# Patient Record
Sex: MALE | Race: WHITE | NOT HISPANIC OR LATINO | Employment: OTHER | URBAN - METROPOLITAN AREA
[De-identification: names, ages, dates, MRNs, and addresses within clinical notes are randomized per-mention and may not be internally consistent; named-entity substitution may affect disease eponyms.]

---

## 2017-01-12 RX ORDER — METOPROLOL TARTRATE 50 MG/1
TABLET, FILM COATED ORAL
Qty: 180 TAB | Refills: 2 | Status: SHIPPED | OUTPATIENT
Start: 2017-01-12 | End: 2017-10-11 | Stop reason: SDUPTHER

## 2017-01-12 RX ORDER — AMLODIPINE BESYLATE 10 MG/1
TABLET ORAL
Qty: 90 TAB | Refills: 2 | Status: SHIPPED | OUTPATIENT
Start: 2017-01-12 | End: 2017-10-11 | Stop reason: SDUPTHER

## 2017-02-05 ENCOUNTER — PATIENT OUTREACH (OUTPATIENT)
Dept: HEALTH INFORMATION MANAGEMENT | Facility: OTHER | Age: 72
End: 2017-02-05

## 2017-02-06 NOTE — PROGRESS NOTES
02/05/2017  Outcome:declined annual wellness visit     Attempt # 1st     Annual Wellness Visit Scheduling  Scheduling Status: Scheduled; Not Scheduled; not scheduled   If Not Scheduled, Choose Reason Why: not interested          Care Gap Scheduling (Attempt to Schedule EACH Overdue Care Gap!)  Health Maintenance Due   Topic    • Annual Wellness Visit  Declined    • IMM DTaP/Tdap/Td Vaccine (1 - Tdap) Didn't discuss    • COLONOSCOPY  Didn't discuss    • IMM ZOSTER VACCINE  Didn't discuss    • IMM PNEUMOCOCCAL 65+ (ADULT) LOW/MEDIUM RISK SERIES (1 of 2 - PCV13) Didn't discuss    • IMM INFLUENZA (1) Didn't discuss          MyChart Activation:  Already Active/Declined/Sent Activation Code  na

## 2017-02-16 RX ORDER — PENTOXIFYLLINE 400 MG/1
TABLET, EXTENDED RELEASE ORAL
Qty: 270 TAB | Refills: 0 | Status: SHIPPED | OUTPATIENT
Start: 2017-02-16 | End: 2017-05-15 | Stop reason: SDUPTHER

## 2017-02-16 RX ORDER — LISINOPRIL AND HYDROCHLOROTHIAZIDE 25; 20 MG/1; MG/1
TABLET ORAL
Qty: 90 TAB | Refills: 0 | Status: SHIPPED | OUTPATIENT
Start: 2017-02-16 | End: 2017-05-15 | Stop reason: SDUPTHER

## 2017-02-27 RX ORDER — FENOFIBRATE 160 MG/1
TABLET ORAL
Qty: 90 TAB | Refills: 3 | Status: SHIPPED | OUTPATIENT
Start: 2017-02-27 | End: 2018-03-03 | Stop reason: SDUPTHER

## 2017-03-13 RX ORDER — TRAZODONE HYDROCHLORIDE 50 MG/1
TABLET ORAL
Qty: 180 TAB | Refills: 0 | Status: SHIPPED | OUTPATIENT
Start: 2017-03-13 | End: 2017-06-09 | Stop reason: SDUPTHER

## 2017-03-14 ENCOUNTER — OFFICE VISIT (OUTPATIENT)
Dept: MEDICAL GROUP | Facility: PHYSICIAN GROUP | Age: 72
End: 2017-03-14
Payer: MEDICARE

## 2017-03-14 VITALS
SYSTOLIC BLOOD PRESSURE: 130 MMHG | TEMPERATURE: 97.8 F | OXYGEN SATURATION: 95 % | RESPIRATION RATE: 16 BRPM | HEART RATE: 55 BPM | WEIGHT: 228 LBS | HEIGHT: 69 IN | BODY MASS INDEX: 33.77 KG/M2 | DIASTOLIC BLOOD PRESSURE: 60 MMHG

## 2017-03-14 DIAGNOSIS — E78.2 MIXED HYPERLIPIDEMIA: ICD-10-CM

## 2017-03-14 DIAGNOSIS — I10 ESSENTIAL HYPERTENSION: ICD-10-CM

## 2017-03-14 DIAGNOSIS — R73.01 ELEVATED FASTING GLUCOSE: ICD-10-CM

## 2017-03-14 DIAGNOSIS — I73.9 PAD (PERIPHERAL ARTERY DISEASE) (HCC): ICD-10-CM

## 2017-03-14 PROCEDURE — G8432 DEP SCR NOT DOC, RNG: HCPCS | Performed by: FAMILY MEDICINE

## 2017-03-14 PROCEDURE — 4040F PNEUMOC VAC/ADMIN/RCVD: CPT | Mod: 8P | Performed by: FAMILY MEDICINE

## 2017-03-14 PROCEDURE — G8484 FLU IMMUNIZE NO ADMIN: HCPCS | Performed by: FAMILY MEDICINE

## 2017-03-14 PROCEDURE — 1101F PT FALLS ASSESS-DOCD LE1/YR: CPT | Performed by: FAMILY MEDICINE

## 2017-03-14 PROCEDURE — 99204 OFFICE O/P NEW MOD 45 MIN: CPT | Performed by: FAMILY MEDICINE

## 2017-03-14 PROCEDURE — 1036F TOBACCO NON-USER: CPT | Performed by: FAMILY MEDICINE

## 2017-03-14 PROCEDURE — G8419 CALC BMI OUT NRM PARAM NOF/U: HCPCS | Performed by: FAMILY MEDICINE

## 2017-03-14 PROCEDURE — 3017F COLORECTAL CA SCREEN DOC REV: CPT | Mod: 1P | Performed by: FAMILY MEDICINE

## 2017-03-14 ASSESSMENT — ENCOUNTER SYMPTOMS
CONSTITUTIONAL NEGATIVE: 1
COUGH: 0
NEUROLOGICAL NEGATIVE: 1
NECK PAIN: 0
EYES NEGATIVE: 1
PALPITATIONS: 0
CHILLS: 0
PSYCHIATRIC NEGATIVE: 1
HEADACHES: 0
GASTROINTESTINAL NEGATIVE: 1
HEMOPTYSIS: 0
CARDIOVASCULAR NEGATIVE: 1
CONSTIPATION: 0
RESPIRATORY NEGATIVE: 1
FEVER: 0
MYALGIAS: 1
DIZZINESS: 0

## 2017-03-14 ASSESSMENT — PATIENT HEALTH QUESTIONNAIRE - PHQ9: CLINICAL INTERPRETATION OF PHQ2 SCORE: 0

## 2017-03-14 NOTE — PROGRESS NOTES
Subjective:      Shun Alvarez is a 72 y.o. male who presents with Establish Care and Back Pain            HPI Comments: New patient visit  Controlled bp but needs lipids to check for ldl due to [facility]hof pvd          1. Mixed hyperlipidemia  Currently treated for HLD, taking meds with no new myalgias or joint pain, watching fats in diet  controlled      - COMP METABOLIC PANEL; Future  - HEMOGLOBIN A1C; Future  - LIPID PROFILE; Future  - MICROALBUMIN 24 HR URINE; Future  - OCCULT BLOOD FECES IMMUNOASSAY; Future    2. Essential hypertension  Currently treated for HTN, taking meds with no CP or sob, monitors bp at home periodically. controlled      - COMP METABOLIC PANEL; Future  - HEMOGLOBIN A1C; Future  - LIPID PROFILE; Future  - MICROALBUMIN 24 HR URINE; Future  - OCCULT BLOOD FECES IMMUNOASSAY; Future    3. PAD (peripheral artery disease) (CMS-[facility])  controlled    - COMP METABOLIC PANEL; Future  - HEMOGLOBIN A1C; Future  - LIPID PROFILE; Future  - MICROALBUMIN 24 HR URINE; Future  - OCCULT BLOOD FECES IMMUNOASSAY; Future    4. Elevated fasting glucose  Need blood test trying to watch diet  controlled    - COMP METABOLIC PANEL; Future  - HEMOGLOBIN A1C; Future  - LIPID PROFILE; Future  - MICROALBUMIN 24 HR URINE; Future  - OCCULT BLOOD FECES IMMUNOASSAY; Future    Past Medical History:    Hypertension                                                  Hyperlipidemia                                                PAD (peripheral artery disease)                               Retinopathy of left eye                                         Comment:due to previous surgery    Snoring                                                       Cataract                                                        Comment:removed bilat    Pain                                            08-      Comment:left leg w/walking, 0/10  Past Surgical History:    RECOVERY                                         6/15/2016        Comment:Procedure: VASCULAR CASE-WHITNEY-ABDOMINAL                AORTOGRAM WITH RUNOFF, POSSIBLE INTERVENTION                86735, 78830, 66887, ATHEROSCLEROSIS LOWER                EXTREMITIES I70.209;  Surgeon: Vishal                Surgery;  Location: SURGERY PRE-POST PROC UNIT                [facility] [place];  Service:     CATARACT EXTRACTION WITH IOL                    Bilateral               UMBILICAL HERNIA REPAIR                          2000          FEMORAL ENDARTERECTOMY                          Left 8/10/2016       Comment:Procedure: FEMORAL ENDARTERECTOMY COMMON;                 Surgeon: Miky Whitney M.D.;  Location:                SURGERY [facility];  Service:     Smoking Status: Former Smoker                   Packs/Day: 1.00  Years: 40         Types: Cigarettes      Quit date: 01/01/1989    Smokeless Status: Never Used                        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    Review of patient's family history indicates:    Diabetes                       Father                    Heart Disease                  Mother                    Heart Disease                  Brother                     Comment: arrhythmia      Current outpatient prescriptions: •  trazodone (DESYREL) 50 MG Tab, TAKE ONE TO TWO TABLETS BY MOUTH AT BEDTIME AS NEEDED FOR SLEEP, Disp: 180 Tab, Rfl: 0•  fenofibrate (TRIGLIDE) 160 MG tablet, TAKE ONE TABLET BY MOUTH ONCE DAILY, Disp: 90 Tab, Rfl: 3•  lisinopril-hydrochlorothiazide (PRINZIDE, ZESTORETIC) 20-25 MG per tablet, TAKE ONE TABLET BY MOUTH DAILY, Disp: 90 Tab, Rfl: 0•  pentoxifylline CR (TRENTAL) 400 MG CR tablet, TAKE ONE TABLET BY MOUTH THREE TIMES A DAY WITH MEALS, Disp: 270 Tab, Rfl: 0•  amlodipine (NORVASC) 10 MG Tab, TAKE ONE TABLET BY MOUTH DAILY, Disp: 90 Tab, Rfl: 2•  metoprolol (LOPRESSOR) 50 MG Tab, TAKE ONE TABLET BY MOUTH TWICE A DAY, Disp: 180 Tab, Rfl: 2•  hydrocodone-acetaminophen (NORCO) 5-325 MG Tab per tablet, Take 1-2 Tabs by  mouth every four hours as needed., Disp: 30 Tab, Rfl: 0•  aspirin (ASA) 81 MG Chew Tab chewable tablet, Take 81 mg by mouth every day., Disp: , Rfl: •  timolol (TIMOPTIC) 0.5 % Solution, Place 1 Drop in both eyes every day., Disp: , Rfl: •  dorzolamide (TRUSOPT) 2 % Solution, Place 1 Drop in both eyes 2 Times a Day., Disp: , Rfl:     Assessment/plan: diagnoses listed as above in problem list. Patient will continue with current medications/diet/lifestyle modifications    Patient will continue with current medication regimen, advised on taking meds every day, f/u in 3 mo.            Review of Systems   Constitutional: Negative.  Negative for fever and chills.        Past Medical History:    Hypertension                                                  Hyperlipidemia                                                PAD (peripheral artery disease)                               Retinopathy of left eye                                         Comment:due to previous surgery    Snoring                                                       Cataract                                                        Comment:removed bilat    Pain                                            08-      Comment:left leg w/walking, 0/10  Past Surgical History:    RECOVERY                                         6/15/2016       Comment:Procedure: VASCULAR CASE-MELVIN-ABDOMINAL                AORTOGRAM WITH RUNOFF, POSSIBLE INTERVENTION                41716, 91302, 70214, ATHEROSCLEROSIS LOWER                EXTREMITIES I70.209;  Surgeon: Recoveryonly                Surgery;  [place] PRE[place] PROC [place]                [facility] [place]  Service:     CATARACT EXTRACTION WITH IOL                    Bilateral               UMBILICAL HERNIA REPAIR                          2000          FEMORAL ENDARTERECTOMY                          Left 8/10/2016       Comment:Procedure: FEMORAL ENDARTERECTOMY COMMON;                 Surgeon: Miky Whitney M.D.;   "Location:                SURGERY [facility];  Service:     Smoking Status: Former Smoker                   Packs/Day: 1.00  Years: 40        Types: Cigarettes      Quit date: 01/01/1989    Smokeless Status: Never Used                        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    Eyes: Negative.    Respiratory: Negative.  Negative for cough and hemoptysis.    Cardiovascular: Negative.  Negative for chest pain and palpitations.   Gastrointestinal: Negative.  Negative for constipation.   Genitourinary: Negative.  Negative for dysuria and urgency.   Musculoskeletal: Positive for myalgias. Negative for neck pain.   Skin: Negative.  Negative for rash.   Neurological: Negative.  Negative for dizziness and headaches.   Endo/Heme/Allergies: Negative.    Psychiatric/Behavioral: Negative.  Negative for suicidal ideas.          Objective:     /60 mmHg  Pulse 55  Temp(Src) 36.6 °C (97.8 °F)  Resp 16  Ht 1.74 m (5' 8.5\")  Wt 103.42 kg (228 lb)  BMI 34.16 kg/m2  SpO2 95%     Physical Exam   Constitutional: He is oriented to person, place, and time. No distress.   HENT:   Head: Normocephalic and atraumatic.   Right Ear: External ear normal.   Left Ear: External ear normal.   Nose: Nose normal.   Mouth/Throat: Oropharynx is clear and moist. No oropharyngeal exudate.   Eyes: Pupils are equal, round, and reactive to light. Right eye exhibits no discharge. Left eye exhibits no discharge. No scleral icterus.   Neck: Normal range of motion. Neck supple. No JVD present. No tracheal deviation present. No thyromegaly present.   Cardiovascular: Normal rate, regular rhythm, normal heart sounds and intact distal pulses.  Exam reveals no gallop " and no friction rub.    No murmur heard.  Pulmonary/Chest: Effort normal and breath sounds normal. No stridor. No respiratory distress. He has no wheezes. He has no rales. He exhibits no tenderness.   Abdominal: Soft. He exhibits no distension. There is no tenderness.   Lymphadenopathy:     He has no cervical adenopathy.   Neurological: He is alert and oriented to person, place, and time.   Skin: Skin is warm and dry. He is not diaphoretic.   Psychiatric: Judgment normal.   Nursing note and vitals reviewed.              Assessment/Plan:     1. Mixed hyperlipidemia    - COMP METABOLIC PANEL; Future  - HEMOGLOBIN A1C; Future  - LIPID PROFILE; Future  - MICROALBUMIN 24 HR URINE; Future  - OCCULT BLOOD FECES IMMUNOASSAY; Future    2. Essential hypertension    - COMP METABOLIC PANEL; Future  - HEMOGLOBIN A1C; Future  - LIPID PROFILE; Future  - MICROALBUMIN 24 HR URINE; Future  - OCCULT BLOOD FECES IMMUNOASSAY; Future    3. PAD (peripheral artery disease) (CMS-[facility])    - COMP METABOLIC PANEL; Future  - HEMOGLOBIN A1C; Future  - LIPID PROFILE; Future  - MICROALBUMIN 24 HR URINE; Future  - OCCULT BLOOD FECES IMMUNOASSAY; Future    4. Elevated fasting glucose    - COMP METABOLIC PANEL; Future  - HEMOGLOBIN A1C; Future  - LIPID PROFILE; Future  - MICROALBUMIN 24 HR URINE; Future  - OCCULT BLOOD FECES IMMUNOASSAY; Future

## 2017-03-14 NOTE — MR AVS SNAPSHOT
"        Shun Alvarez   3/14/2017 2:15 PM   Office Visit   MRN: [id number]    Department:  [facility]    Dept Phone:  [phone]    Description:  Male : 1945   Provider:  Bobby Parker M.D.           Reason for Visit     Establish Care     Back Pain           Allergies as of 3/14/2017     No Known Allergies      You were diagnosed with     Mixed hyperlipidemia   [272.2.ICD-9-CM]       Essential hypertension   [4074839]       PAD (peripheral artery disease) (CMS-HCC)   [974911]       Elevated fasting glucose   [263326]         Vital Signs     Blood Pressure Pulse Temperature Respirations Height Weight    130/60 mmHg 55 36.6 °C (97.8 °F) 16 1.74 m (5' 8.5\") 103.42 kg (228 lb)    Body Mass Index Oxygen Saturation Smoking Status             34.16 kg/m2 95% Former Smoker         Basic Information     Date Of Birth Sex Race Ethnicity Preferred Language    1945 Male White, White Non- English      Problem List              ICD-10-CM Priority Class Noted - Resolved    Hyperlipidemia E78.5   2013 - Present    Hypertension I10   2013 - Present    PAD (peripheral artery disease) (CMS-HCC) I73.9   2013 - Present    Insomnia G47.00   2013 - Present    Elevated fasting glucose R73.01   9/3/2014 - Present    CRVO (central retinal vein occlusion) H34.8192   2016 - Present    Disease of artery (CMS-HCC) I77.9   8/10/2016 - Present      Health Maintenance        Date Due Completion Dates    IMM DTaP/Tdap/Td Vaccine (1 - Tdap) 1964 ---    COLONOSCOPY 1995 ---    IMM ZOSTER VACCINE 2005 ---    IMM PNEUMOCOCCAL 65+ (ADULT) LOW/MEDIUM RISK SERIES (1 of 2 - PCV13) 2010 ---    IMM INFLUENZA (1) 2016 ---            Current Immunizations     No immunizations on file.      Below and/or attached are the medications your provider expects you to take. Review all of your home medications and newly ordered medications with your provider and/or pharmacist. Follow " medication instructions as directed by your provider and/or pharmacist. Please keep your medication list with you and share with your provider. Update the information when medications are discontinued, doses are changed, or new medications (including over-the-counter products) are added; and carry medication information at all times in the event of emergency situations     Allergies:  No Known Allergies          Medications  Valid as of: March 14, 2017 -  2:31 PM    Generic Name Brand Name Tablet Size Instructions for use    AmLODIPine Besylate (Tab) NORVASC 10 MG TAKE ONE TABLET BY MOUTH DAILY        Aspirin (Chew Tab) ASA 81 MG Take 81 mg by mouth every day.        Dorzolamide HCl (Solution) TRUSOPT 2 % Place 1 Drop in both eyes 2 Times a Day.        Fenofibrate (Tab) TRIGLIDE 160 MG TAKE ONE TABLET BY MOUTH ONCE DAILY        Hydrocodone-Acetaminophen (Tab) NORCO 5-325 MG Take 1-2 Tabs by mouth every four hours as needed.        Lisinopril-Hydrochlorothiazide (Tab) PRINZIDE, ZESTORETIC 20-25 MG TAKE ONE TABLET BY MOUTH DAILY        Metoprolol Tartrate (Tab) LOPRESSOR 50 MG TAKE ONE TABLET BY MOUTH TWICE A DAY        Pentoxifylline (Tab CR) TRENTAL 400 MG TAKE ONE TABLET BY MOUTH THREE TIMES A DAY WITH MEALS        Timolol Maleate (Solution) TIMOPTIC 0.5 % Place 1 Drop in both eyes every day.        TraZODone HCl (Tab) DESYREL 50 MG TAKE ONE TO TWO TABLETS BY MOUTH AT BEDTIME AS NEEDED FOR SLEEP        .                 Medicines prescribed today were sent to:     [id number] - [place], [place] - [address]    [address] [facility] [address]    Phone: [phone] Fax: [phone]    Open 24 Hours?: No      Medication refill instructions:       If your prescription bottle indicates you have medication refills left, it is not necessary to call your provider’s office. Please contact your pharmacy and they will refill your medication.    If your prescription bottle indicates you do not have any refills left,  you may request refills at any time through one of the following ways: The online [employer] system (except Urgent Care), by calling your provider’s office, or by asking your pharmacy to contact your provider’s office with a refill request. Medication refills are processed only during regular business hours and may not be available until the next business day. Your provider may request additional information or to have a follow-up visit with you prior to refilling your medication.   *Please Note: Medication refills are assigned a new Rx number when refilled electronically. Your pharmacy may indicate that no refills were authorized even though a new prescription for the same medication is available at the pharmacy. Please request the medicine by name with the pharmacy before contacting your provider for a refill.        Your To Do List     Future Labs/Procedures Complete By Expires    COMP METABOLIC PANEL  As directed 3/14/2018    HEMOGLOBIN A1C  As directed 3/14/2018    LIPID PROFILE  As directed 3/14/2018    MICROALBUMIN 24 HR URINE  As directed 3/14/2018    OCCULT BLOOD FECES IMMUNOASSAY  As directed 3/14/2018         [employer] Access Code: Activation code not generated  Current [employer] Status: Active

## 2017-05-16 RX ORDER — PENTOXIFYLLINE 400 MG/1
TABLET, EXTENDED RELEASE ORAL
Qty: 270 TAB | Refills: 0 | Status: SHIPPED | OUTPATIENT
Start: 2017-05-16 | End: 2017-08-11 | Stop reason: SDUPTHER

## 2017-05-16 RX ORDER — LISINOPRIL AND HYDROCHLOROTHIAZIDE 25; 20 MG/1; MG/1
TABLET ORAL
Qty: 90 TAB | Refills: 0 | Status: SHIPPED | OUTPATIENT
Start: 2017-05-16 | End: 2017-08-11 | Stop reason: SDUPTHER

## 2017-05-16 NOTE — TELEPHONE ENCOUNTER
Was the patient seen in the last year in this department? Yes     Does patient have an active prescription for medications requested? Yes     Received Request Via: Pharmacy       Last office visit 03/14/17  Last labs 08/10/16

## 2017-06-09 NOTE — TELEPHONE ENCOUNTER
Was the patient seen in the last year in this department? Yes     Does patient have an active prescription for medications requested? No     Received Request Via: Patient   Last seen 3/14/17  Last labs 8/10/16

## 2017-06-12 RX ORDER — TRAZODONE HYDROCHLORIDE 50 MG/1
TABLET ORAL
Qty: 180 TAB | Refills: 0 | Status: SHIPPED | OUTPATIENT
Start: 2017-06-12 | End: 2017-09-07 | Stop reason: SDUPTHER

## 2017-08-11 NOTE — TELEPHONE ENCOUNTER
Was the patient seen in the last year in this department? Yes     Does patient have an active prescription for medications requested? No     Received Request Via: Pharmacy   Last seen 3/14/17  Last labs  8/10/16

## 2017-08-14 RX ORDER — PENTOXIFYLLINE 400 MG/1
TABLET, EXTENDED RELEASE ORAL
Qty: 270 TAB | Refills: 0 | Status: SHIPPED | OUTPATIENT
Start: 2017-08-14 | End: 2017-11-09 | Stop reason: SDUPTHER

## 2017-08-14 RX ORDER — LISINOPRIL AND HYDROCHLOROTHIAZIDE 25; 20 MG/1; MG/1
TABLET ORAL
Qty: 90 TAB | Refills: 0 | Status: SHIPPED | OUTPATIENT
Start: 2017-08-14 | End: 2017-11-09 | Stop reason: SDUPTHER

## 2017-09-07 RX ORDER — TRAZODONE HYDROCHLORIDE 50 MG/1
TABLET ORAL
Qty: 180 TAB | Refills: 0 | Status: SHIPPED | OUTPATIENT
Start: 2017-09-07 | End: 2019-04-02

## 2017-10-12 RX ORDER — METOPROLOL TARTRATE 50 MG/1
TABLET, FILM COATED ORAL
Qty: 180 TAB | Refills: 1 | Status: SHIPPED | OUTPATIENT
Start: 2017-10-12 | End: 2018-04-04 | Stop reason: SDUPTHER

## 2017-10-12 RX ORDER — AMLODIPINE BESYLATE 10 MG/1
TABLET ORAL
Qty: 90 TAB | Refills: 1 | Status: SHIPPED | OUTPATIENT
Start: 2017-10-12 | End: 2018-04-04 | Stop reason: SDUPTHER

## 2017-11-09 RX ORDER — PENTOXIFYLLINE 400 MG/1
TABLET, EXTENDED RELEASE ORAL
Qty: 270 TAB | Refills: 0 | Status: SHIPPED | OUTPATIENT
Start: 2017-11-09 | End: 2018-02-06 | Stop reason: SDUPTHER

## 2017-11-09 RX ORDER — LISINOPRIL AND HYDROCHLOROTHIAZIDE 25; 20 MG/1; MG/1
TABLET ORAL
Qty: 90 TAB | Refills: 0 | Status: SHIPPED | OUTPATIENT
Start: 2017-11-09 | End: 2018-02-06 | Stop reason: SDUPTHER

## 2017-11-09 NOTE — TELEPHONE ENCOUNTER
Was the patient seen in the last year in this department? Yes     Does patient have an active prescription for medications requested? Yes     Received Request Via: Pharmacy     Last seen 3/14/17  Last labs 8/10/16

## 2018-01-24 ENCOUNTER — PATIENT OUTREACH (OUTPATIENT)
Dept: HEALTH INFORMATION MANAGEMENT | Facility: OTHER | Age: 73
End: 2018-01-24

## 2018-01-24 NOTE — PROGRESS NOTES
1. Attempt #: 1    2. [facility] Verified: yes    3. Verify PCP: yes    4. Care Team Updated:       •   DME Company (gait device, O2, CPAP, etc.): YES       •   Other Specialists (eye doctor, derm, GYN, cardiology, endo, etc): YES    5.  Reviewed/Updated the following with patient:       •   Communication Preference Obtained? YES       •   Preferred Pharmacy? YES       •   Preferred Lab? YES       •   Family History (document living status of immediate family members and if + hx of cancer, diabetes, hypertension, hyperlipidemia, heart attack, stroke) YES. Was Abstract Encounter opened and chart updated? YES    6. [employer] Activation: already active    7. [employer] Kayce: no    8. Annual Wellness Visit Scheduling  Scheduling Status:Scheduled      9. Care Gap Scheduling (Attempt to Schedule EACH Overdue Care Gap!)     Health Maintenance Due   Topic Date Due   • Annual Wellness Visit  1945   • IMM DTaP/Tdap/Td Vaccine (1 - Tdap) 02/27/1964   • COLONOSCOPY  02/27/1995   • IMM ZOSTER VACCINE  02/27/2005   • IMM PNEUMOCOCCAL 65+ (ADULT) LOW/MEDIUM RISK SERIES (1 of 2 - PCV13) 02/27/2010   • IMM INFLUENZA (1) 09/01/2017        Scheduled patient for Annual Wellness Visit  Patient prefers to discuss Colonoscopy/FIT with PCP.    10. Patient was advised: “This is a free wellness visit. The provider will screen for medical conditions to help you stay healthy. If you have other concerns to address you may be asked to discuss these at a separate visit or there may be an additional fee.”     11. Patient was informed to arrive 15 min prior to their scheduled appointment and bring in their medication bottles.

## 2018-02-06 RX ORDER — LISINOPRIL AND HYDROCHLOROTHIAZIDE 25; 20 MG/1; MG/1
TABLET ORAL
Qty: 90 TAB | Refills: 0 | Status: SHIPPED | OUTPATIENT
Start: 2018-02-06 | End: 2018-05-23 | Stop reason: SDUPTHER

## 2018-02-06 RX ORDER — PENTOXIFYLLINE 400 MG/1
TABLET, EXTENDED RELEASE ORAL
Qty: 270 TAB | Refills: 0 | Status: SHIPPED | OUTPATIENT
Start: 2018-02-06 | End: 2018-07-27 | Stop reason: SDUPTHER

## 2018-02-06 NOTE — TELEPHONE ENCOUNTER
Was the patient seen in the last year in this department? Yes     Does patient have an active prescription for medications requested? Yes     Received Request Via: Pharmacy     Last visit 3/15/2017  Last labs 8/10/2016  Next appt: 2/13/2018

## 2018-02-13 ENCOUNTER — OFFICE VISIT (OUTPATIENT)
Dept: MEDICAL GROUP | Facility: PHYSICIAN GROUP | Age: 73
End: 2018-02-13
Payer: MEDICARE

## 2018-02-13 VITALS
BODY MASS INDEX: 34.79 KG/M2 | TEMPERATURE: 98.1 F | WEIGHT: 232.2 LBS | RESPIRATION RATE: 16 BRPM | OXYGEN SATURATION: 96 % | HEART RATE: 60 BPM | DIASTOLIC BLOOD PRESSURE: 80 MMHG | SYSTOLIC BLOOD PRESSURE: 122 MMHG

## 2018-02-13 DIAGNOSIS — E78.2 MIXED HYPERLIPIDEMIA: ICD-10-CM

## 2018-02-13 DIAGNOSIS — I73.9 PAD (PERIPHERAL ARTERY DISEASE) (HCC): ICD-10-CM

## 2018-02-13 DIAGNOSIS — I10 ESSENTIAL HYPERTENSION: ICD-10-CM

## 2018-02-13 DIAGNOSIS — Z00.00 MEDICARE ANNUAL WELLNESS VISIT, INITIAL: ICD-10-CM

## 2018-02-13 DIAGNOSIS — Z00.00 WELL ADULT EXAM: ICD-10-CM

## 2018-02-13 DIAGNOSIS — M25.552 LEFT HIP PAIN: ICD-10-CM

## 2018-02-13 PROCEDURE — 99213 OFFICE O/P EST LOW 20 MIN: CPT | Mod: 25 | Performed by: FAMILY MEDICINE

## 2018-02-13 PROCEDURE — G0438 PPPS, INITIAL VISIT: HCPCS | Mod: 25 | Performed by: FAMILY MEDICINE

## 2018-02-13 RX ORDER — MELOXICAM 7.5 MG/1
7.5 TABLET ORAL DAILY
Qty: 30 TAB | Refills: 3 | Status: SHIPPED | OUTPATIENT
Start: 2018-02-13 | End: 2019-04-02

## 2018-02-13 ASSESSMENT — ENCOUNTER SYMPTOMS
HEMOPTYSIS: 0
FEVER: 0
CONSTITUTIONAL NEGATIVE: 1
PSYCHIATRIC NEGATIVE: 1
HIP PAIN: 1
NEUROLOGICAL NEGATIVE: 1
ABDOMINAL PAIN: 0
CONSTIPATION: 0
HEADACHES: 0
NECK PAIN: 0
MYALGIAS: 1
RESPIRATORY NEGATIVE: 1
EYES NEGATIVE: 1
COUGH: 0
CARDIOVASCULAR NEGATIVE: 1
GASTROINTESTINAL NEGATIVE: 1
PALPITATIONS: 0
ARTHRALGIAS: 1
DIZZINESS: 0
CHILLS: 0

## 2018-02-13 ASSESSMENT — PATIENT HEALTH QUESTIONNAIRE - PHQ9: CLINICAL INTERPRETATION OF PHQ2 SCORE: 0

## 2018-02-13 ASSESSMENT — ACTIVITIES OF DAILY LIVING (ADL): BATHING_REQUIRES_ASSISTANCE: 0

## 2018-02-13 NOTE — PROGRESS NOTES
Chief Complaint   Patient presents with   • Annual Wellness Visit   • Pain     Left hip x2 weeks         HPI:  Shun is a 72 y.o. here for Medicare Annual Wellness Visit        Patient Active Problem List    Diagnosis Date Noted   • Disease of artery (CMS-HCC) 08/10/2016   • CRVO (central retinal vein occlusion) 01/04/2016   • Elevated fasting glucose 09/03/2014   • Hyperlipidemia 08/05/2013   • Hypertension 08/05/2013   • PAD (peripheral artery disease) (CMS-HCC) 08/05/2013   • Insomnia 08/05/2013       Current Outpatient Prescriptions   Medication Sig Dispense Refill   • pentoxifylline CR (TRENTAL) 400 MG CR tablet TAKE ONE TABLET BY MOUTH THREE TIMES A DAY WITH MEALS 270 Tab 0   • lisinopril-hydrochlorothiazide (PRINZIDE, ZESTORETIC) 20-25 MG per tablet TAKE ONE TABLET BY MOUTH DAILY 90 Tab 0   • metoprolol (LOPRESSOR) 50 MG Tab TAKE ONE TABLET BY MOUTH TWICE A  Tab 1   • amlodipine (NORVASC) 10 MG Tab TAKE ONE TABLET BY MOUTH DAILY 90 Tab 1   • trazodone (DESYREL) 50 MG Tab TAKE ONE TO TWO TABLETS BY MOUTH EVERY NIGHT AT BEDTIME AS NEEDED FOR SLEEP 180 Tab 0   • fenofibrate (TRIGLIDE) 160 MG tablet TAKE ONE TABLET BY MOUTH ONCE DAILY 90 Tab 3   • aspirin (ASA) 81 MG Chew Tab chewable tablet Take 81 mg by mouth every day.     • timolol (TIMOPTIC) 0.5 % Solution Place 1 Drop in both eyes every day.     • dorzolamide (TRUSOPT) 2 % Solution Place 1 Drop in both eyes 2 Times a Day.     • hydrocodone-acetaminophen (NORCO) 5-325 MG Tab per tablet Take 1-2 Tabs by mouth every four hours as needed. 30 Tab 0     No current facility-administered medications for this visit.         Patient is taking medications as noted in medication list.  Current supplements as per medication list.     Allergies: Patient has no known allergies.    Current social contact/activities: TV and casinos    Is patient current with immunizations? Yes. Pt declined the immunizations that are due.    He  reports that he quit smoking about 29  years ago. His smoking use included Cigarettes. He has a 40.00 pack-year smoking history. He has never used smokeless tobacco. He reports that he does not drink alcohol or use drugs.  Counseling given: Not Answered        DPA/Advanced directive: Patient has Living Will, but it is not on file. Instructed to bring in a copy to scan into their chart.    ROS:    Gait: Uses no assistive device   Ostomy: no   Other tubes: no   Amputations: no   Chronic oxygen use no   Last eye exam 12/2017  Wears hearing aids: no   : Denies any urinary leakage during the last 6 months    Screening:      Depression Screening    Little interest or pleasure in doing things?  0 - not at all  Feeling down, depressed, or hopeless? 0 - not at all  Patient Health Questionnaire Score: 0    If depressive symptoms identified deferred to follow up visit unless specifically addressed in assessment and plan.    Interpretation of PHQ-9 Total Score   Score Severity   1-4 No Depression   5-9 Mild Depression   10-14 Moderate Depression   15-19 Moderately Severe Depression   20-27 Severe Depression    Screening for Cognitive Impairment    Three Minute Recall (apple, watch, benedict)  2/3    Draw clock face with all 12 numbers set to the hand to show 10 minutes past 11 o'clock  1    If cognitive concerns identified, deferred for follow up unless specifically addressed in assessment and plan.    Fall Risk Assessment    Has the patient had two or more falls in the last year or any fall with injury in the last year?  No  If fall risk identified, deferred for follow up unless specifically addressed in assessment and plan.    Safety Assessment    Throw rugs on floor.  Yes  Handrails on all stairs.  No  Good lighting in all hallways.  Yes  Difficulty hearing.  No  Patient counseled about all safety risks that were identified.    Functional Assessment ADLs    Are there any barriers preventing you from cooking for yourself or meeting nutritional needs?  No.    Are  there any barriers preventing you from driving safely or obtaining transportation?  Yes.    Are there any barriers preventing you from using a telephone or calling for help?  No.    Are there any barriers preventing you from shopping?  No.    Are there any barriers preventing you from taking care of your own finances?  No.    Are there any barriers preventing you from managing your medications?  No.    Are there any barriers preventing you from showering/bathing yourself?  No.    Are you currently engaging any exercise or physical activity?  No.       Health Maintenance Summary                IMM DTaP/Tdap/Td Vaccine Overdue 2/27/1964     COLONOSCOPY Overdue 2/27/1995     IMM ZOSTER VACCINE Overdue 2/27/2005     IMM PNEUMOCOCCAL 65+ (ADULT) LOW/MEDIUM RISK SERIES Overdue 2/27/2010     IMM INFLUENZA Overdue 9/1/2017           Patient Care Team:  Bobby Parker M.D. as PCP - General (Family Medicine)  Shasta Andino M.D. 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   Past Surgical History:   Procedure Laterality Date   • FEMORAL ENDARTERECTOMY Left 8/10/2016    Procedure: FEMORAL ENDARTERECTOMY COMMON;  Surgeon: Miky Whitney M.D.;  Location: SURGERY [facility];  Service:    • RECOVERY  6/15/2016    Procedure: VASCULAR CASE-MELVIN-ABDOMINAL AORTOGRAM WITH RUNOFF, POSSIBLE INTERVENTION 50838, 45071, 25934, ATHEROSCLEROSIS LOWER EXTREMITIES I70.209;  Surgeon: Vishal  Surgery;  Location: SURGERY PRE-POST PROC UNIT [facility] [place];  Service:    • UMBILICAL HERNIA REPAIR  2000   • CATARACT EXTRACTION WITH IOL Bilateral            Exam:     Blood pressure 122/80, pulse 60, temperature 36.7 °C (98.1 °F), resp. rate 16, weight 105.3 kg (232 lb 3.2 oz), SpO2 96 %. Body mass index is 34.79 kg/m².    Hearing good.    Dentition fair  Alert, oriented in no acute distress.  Eye contact is good, speech goal directed, affect calm      Assessment and Plan. The following treatment and monitoring plan is recommended:    1. Medicare annual wellness visit, initial  Patient was advised that one or more vaccines are recommended today as according to their HM. They currently decline all vaccines advised today despite our encouragement    - Initial Wellness Visit - Includes PPPS ()  - OCCULT BLOOD FECES IMMUNOASSAY (FIT); Future    2. PAD (peripheral artery disease) (CMS-HCC)  On statin and thinner  Has not seen vascular surgery for f/u since left fem pop 3 years ago, needs new referral to see them for eval    3. Well adult exam    - Initial Wellness Visit - Includes PPPS ()  - OCCULT BLOOD FECES IMMUNOASSAY (FIT); Future    4. Mixed hyperlipidemia  Currently treated for HLD, taking meds with no new myalgias or joint pain, watching fats in diet  controlled        5. Essential hypertension  Currently treated for HTN, taking meds with no CP or sob, monitors bp at home periodically. controlled        Past Medical History:   Diagnosis Date   • Cataract     removed bilat   • Hyperlipidemia    • Hypertension    • PAD (peripheral artery disease)    • Pain 08-    left leg w/walking, 0/10   • Retinopathy of left eye     due to previous surgery   • Snoring      Past Surgical History:   Procedure Laterality Date   • FEMORAL ENDARTERECTOMY Left 8/10/2016    Procedure: FEMORAL ENDARTERECTOMY COMMON;  Surgeon: Miky Whitney M.D.;  Location: SURGERY [facility];  Service:    • RECOVERY  6/15/2016     Procedure: VASCULAR CASE-CHARLES-ABDOMINAL AORTOGRAM WITH RUNOFF, POSSIBLE INTERVENTION 50606, 83858, 79775, ATHEROSCLEROSIS LOWER EXTREMITIES I70.209;  Surgeon: Recoveryonly Surgery;  Location: SURGERY PRE-[place] PROC UNIT [facility] [place];  Service:    • UMBILICAL HERNIA REPAIR  2000   • CATARACT EXTRACTION WITH IOL Bilateral      Social History   Substance Use Topics   • Smoking status: Former Smoker     Packs/day: 1.00     Years: 40.00     Types: Cigarettes     Quit date: 1/1/1989   • Smokeless tobacco: Never Used   • 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     Family History   Problem Relation Age of Onset   • Diabetes Father    • Heart Disease Mother    • Heart Disease Brother      arrhythmia         Current Outpatient Prescriptions:   •  pentoxifylline CR (TRENTAL) 400 MG CR tablet, TAKE ONE TABLET BY MOUTH THREE TIMES A DAY WITH MEALS, Disp: 270 Tab, Rfl: 0  •  lisinopril-hydrochlorothiazide (PRINZIDE, ZESTORETIC) 20-25 MG per tablet, TAKE ONE TABLET BY MOUTH DAILY, Disp: 90 Tab, Rfl: 0  •  metoprolol (LOPRESSOR) 50 MG Tab, TAKE ONE TABLET BY MOUTH TWICE A DAY, Disp: 180 Tab, Rfl: 1  •  amlodipine (NORVASC) 10 MG Tab, TAKE ONE TABLET BY MOUTH DAILY, Disp: 90 Tab, Rfl: 1  •  trazodone (DESYREL) 50 MG Tab, TAKE ONE TO TWO TABLETS BY MOUTH EVERY NIGHT AT BEDTIME AS NEEDED FOR SLEEP, Disp: 180 Tab, Rfl: 0  •  fenofibrate (TRIGLIDE) 160 MG tablet, TAKE ONE TABLET BY MOUTH ONCE DAILY, Disp: 90 Tab, Rfl: 3  •  aspirin (ASA) 81 MG Chew Tab chewable tablet, Take 81 mg by mouth every day., Disp: , Rfl:   •  timolol (TIMOPTIC) 0.5 % Solution, Place 1 Drop in both eyes every day., Disp: , Rfl:   •  dorzolamide (TRUSOPT) 2 % Solution, Place 1 Drop in both eyes 2 Times a Day., Disp: , Rfl:     Patient was instructed on the use of medications, either prescriptions or OTC and informed on when the appropriate follow up time period should be. In addition, patient was also instructed that should any acute worsening occur that they should  notify this clinic asap or call 911.          Services suggested: No services needed at this time  Health Care Screening recommendations as per orders if indicated.  Referrals offered: PT/OT/Nutrition counseling/Behavioral Health/Smoking cessation as per orders if indicated.    Discussion today about general wellness and lifestyle habits:    · Prevent falls and reduce trip hazards; Cautioned about securing or removing rugs.  · Have a working fire alarm and carbon monoxide detector;   · Engage in regular physical activity and social activities       Follow-up: No Follow-up on file.

## 2018-02-14 ENCOUNTER — HOSPITAL ENCOUNTER (OUTPATIENT)
Dept: LAB | Facility: MEDICAL CENTER | Age: 73
End: 2018-02-14
Attending: FAMILY MEDICINE
Payer: MEDICARE

## 2018-02-14 DIAGNOSIS — I73.9 PAD (PERIPHERAL ARTERY DISEASE) (HCC): ICD-10-CM

## 2018-02-14 DIAGNOSIS — E78.2 MIXED HYPERLIPIDEMIA: ICD-10-CM

## 2018-02-14 LAB
25(OH)D3 SERPL-MCNC: 13 NG/ML (ref 30–100)
ALBUMIN SERPL BCP-MCNC: 3.7 G/DL (ref 3.2–4.9)
ALBUMIN/GLOB SERPL: 1 G/DL
ALP SERPL-CCNC: 34 U/L (ref 30–99)
ALT SERPL-CCNC: 21 U/L (ref 2–50)
ANION GAP SERPL CALC-SCNC: 7 MMOL/L (ref 0–11.9)
AST SERPL-CCNC: 22 U/L (ref 12–45)
BILIRUB SERPL-MCNC: 0.4 MG/DL (ref 0.1–1.5)
BUN SERPL-MCNC: 28 MG/DL (ref 8–22)
CALCIUM SERPL-MCNC: 9.1 MG/DL (ref 8.5–10.5)
CHLORIDE SERPL-SCNC: 109 MMOL/L (ref 96–112)
CHOLEST SERPL-MCNC: 168 MG/DL (ref 100–199)
CO2 SERPL-SCNC: 26 MMOL/L (ref 20–33)
CREAT SERPL-MCNC: 1.58 MG/DL (ref 0.5–1.4)
ERYTHROCYTE [DISTWIDTH] IN BLOOD BY AUTOMATED COUNT: 43.3 FL (ref 35.9–50)
GLOBULIN SER CALC-MCNC: 3.8 G/DL (ref 1.9–3.5)
GLUCOSE SERPL-MCNC: 130 MG/DL (ref 65–99)
HCT VFR BLD AUTO: 46.3 % (ref 42–52)
HDLC SERPL-MCNC: 26 MG/DL
HGB BLD-MCNC: 15.7 G/DL (ref 14–18)
LDLC SERPL CALC-MCNC: 81 MG/DL
MCH RBC QN AUTO: 31.3 PG (ref 27–33)
MCHC RBC AUTO-ENTMCNC: 33.9 G/DL (ref 33.7–35.3)
MCV RBC AUTO: 92.2 FL (ref 81.4–97.8)
PLATELET # BLD AUTO: 132 K/UL (ref 164–446)
PMV BLD AUTO: 12 FL (ref 9–12.9)
POTASSIUM SERPL-SCNC: 3.7 MMOL/L (ref 3.6–5.5)
PROT SERPL-MCNC: 7.5 G/DL (ref 6–8.2)
RBC # BLD AUTO: 5.02 M/UL (ref 4.7–6.1)
SODIUM SERPL-SCNC: 142 MMOL/L (ref 135–145)
T3 SERPL-MCNC: 77.4 NG/DL (ref 60–181)
T4 FREE SERPL-MCNC: 0.91 NG/DL (ref 0.53–1.43)
TRIGL SERPL-MCNC: 304 MG/DL (ref 0–149)
TSH SERPL DL<=0.005 MIU/L-ACNC: 2.64 UIU/ML (ref 0.38–5.33)
WBC # BLD AUTO: 6.4 K/UL (ref 4.8–10.8)

## 2018-02-14 PROCEDURE — 80053 COMPREHEN METABOLIC PANEL: CPT

## 2018-02-14 PROCEDURE — 82306 VITAMIN D 25 HYDROXY: CPT

## 2018-02-14 PROCEDURE — 85027 COMPLETE CBC AUTOMATED: CPT

## 2018-02-14 PROCEDURE — 84480 ASSAY TRIIODOTHYRONINE (T3): CPT

## 2018-02-14 PROCEDURE — 84439 ASSAY OF FREE THYROXINE: CPT

## 2018-02-14 PROCEDURE — 80061 LIPID PANEL: CPT

## 2018-02-14 PROCEDURE — 84443 ASSAY THYROID STIM HORMONE: CPT

## 2018-02-14 PROCEDURE — 36415 COLL VENOUS BLD VENIPUNCTURE: CPT

## 2018-02-14 NOTE — PROGRESS NOTES
Subjective:      Shun Alvarez is a 72 y.o. male who presents with Annual Wellness Visit and Pain (Left hip x2 weeks)            1. Medicare annual wellness visit, initial    - Initial Wellness Visit - Includes PPPS ()  - OCCULT BLOOD FECES IMMUNOASSAY (FIT); Future    2. PAD (peripheral artery disease) (CMS-[facility])    - REFERRAL TO VASCULAR SURGERY  - LIPID PROFILE; Future  - COMP METABOLIC PANEL; Future  - FREE THYROXINE; Future  - TRIIDOTHYRONINE; Future  - TSH; Future  - VITAMIN D,25 HYDROXY; Future  - CBC WITHOUT DIFFERENTIAL; Future    3. Well adult exam    - Initial Wellness Visit - Includes PPPS ()  - OCCULT BLOOD FECES IMMUNOASSAY (FIT); Future    4. Mixed hyperlipidemia    - LIPID PROFILE; Future  - COMP METABOLIC PANEL; Future  - FREE THYROXINE; Future  - TRIIDOTHYRONINE; Future  - TSH; Future  - VITAMIN D,25 HYDROXY; Future  - CBC WITHOUT DIFFERENTIAL; Future    5. Essential hypertension      6. Left hip pain  Left hip pain for the past 6 months  Worse when lying on the left side or when walking after sitting for prolonged time  - DX-HIP-UNILATERAL-WITH PELVIS-1 VIEW LEFT; Future  - meloxicam (MOBIC) 7.5 MG Tab; Take 1 Tab by mouth every day.  Dispense: 30 Tab; Refill: 3    Past Medical History:  No date: Cataract      Comment: removed bilat  No date: Hyperlipidemia  No date: Hypertension  No date: PAD (peripheral artery disease)  08-: Pain      Comment: left leg w/walking, 0/10  No date: Retinopathy of left eye      Comment: due to previous surgery  No date: Snoring  Past Surgical History:  8/10/2016: FEMORAL ENDARTERECTOMY Left      Comment: Procedure: FEMORAL ENDARTERECTOMY COMMON;                 Surgeon: Miky Whitney M.D.;  Location:                SURGERY [facility];  Service:   6/15/2016: RECOVERY      Comment: Procedure: VASCULAR CASE-MELVIN-ABDOMINAL                AORTOGRAM WITH RUNOFF, POSSIBLE INTERVENTION                38205, 35490, 15574, ATHEROSCLEROSIS LOWER                 EXTREMITIES I70.209;  Surgeon: Recovery[facility]                Surgery;  Location: SURGERY PRE-POST PROC UNIT                [facility] [place];  Service:   2000: UMBILICAL HERNIA REPAIR  No date: CATARACT EXTRACTION WITH IOL Bilateral  Smoking status: Former Smoker                                                              Packs/day: 1.00      Years: 40.00        Types: Cigarettes     Quit date: 1/1/1989  Smokeless tobacco: Never Used                      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    Review of patient's family history indicates:    Diabetes                       Father                    Heart Disease                  Mother                    Heart Disease                  Brother                     Comment: arrhythmia      Current Outpatient Prescriptions: •  meloxicam (MOBIC) 7.5 MG Tab, Take 1 Tab by mouth every day., Disp: 30 Tab, Rfl: 3•  pentoxifylline CR (TRENTAL) 400 MG CR tablet, TAKE ONE TABLET BY MOUTH THREE TIMES A DAY WITH MEALS, Disp: 270 Tab, Rfl: 0•  lisinopril-hydrochlorothiazide (PRINZIDE, ZESTORETIC) 20-25 MG per tablet, TAKE ONE TABLET BY MOUTH DAILY, Disp: 90 Tab, Rfl: 0•  metoprolol (LOPRESSOR) 50 MG Tab, TAKE ONE TABLET BY MOUTH TWICE A DAY, Disp: 180 Tab, Rfl: 1•  amlodipine (NORVASC) 10 MG Tab, TAKE ONE TABLET BY MOUTH DAILY, Disp: 90 Tab, Rfl: 1•  trazodone (DESYREL) 50 MG Tab, TAKE ONE TO TWO TABLETS BY MOUTH EVERY NIGHT AT BEDTIME AS NEEDED FOR SLEEP, Disp: 180 Tab, Rfl: 0•  fenofibrate (TRIGLIDE) 160 MG tablet, TAKE ONE TABLET BY MOUTH ONCE DAILY, Disp: 90 Tab, Rfl: 3•  aspirin (ASA) 81 MG Chew Tab chewable tablet, Take 81 mg by mouth every day., Disp: , Rfl: •  timolol (TIMOPTIC) 0.5 % Solution, Place 1 Drop in both eyes every day., Disp: , Rfl: •  dorzolamide (TRUSOPT) 2 % Solution, Place 1 Drop in both eyes 2 Times a Day., Disp: , Rfl:     Patient was instructed on the use of medications, either prescriptions or OTC and informed on when the appropriate  follow up time period should be. In addition, patient was also instructed that should any acute worsening occur that they should notify this clinic asap or call 911.          Hip Pain   This is a new problem. The current episode started more than 1 month ago. The problem occurs intermittently. The problem has been unchanged. Associated symptoms include arthralgias and myalgias. Pertinent negatives include no abdominal pain, chest pain, chills, coughing, fever, headaches, neck pain or rash. The symptoms are aggravated by eating. He has tried acetaminophen and NSAIDs for the symptoms. The treatment provided mild relief.       Review of Systems   Constitutional: Negative.  Negative for chills and fever.        Past Medical History:  No date: Cataract      Comment: removed bilat  No date: Hyperlipidemia  No date: Hypertension  No date: PAD (peripheral artery disease)  08-: Pain      Comment: left leg w/walking, 0/10  No date: Retinopathy of left eye      Comment: due to previous surgery  No date: Snoring  Past Surgical History:  8/10/2016: FEMORAL ENDARTERECTOMY Left      Comment: Procedure: FEMORAL ENDARTERECTOMY COMMON;                 Surgeon: Miky Whitney M.D.;  Location:                SURGERY [facility];  Service:   6/15/2016: RECOVERY      Comment: Procedure: VASCULAR CASE-MELVIN-ABDOMINAL                AORTOGRAM WITH RUNOFF, POSSIBLE INTERVENTION                44116, 70031, 60586, ATHEROSCLEROSIS LOWER                EXTREMITIES I70.209;  Surgeon: [facility]                Surgery;  Location: SURGERY PRE-POST PROC UNIT                [facility] [place];  Service:   2000: UMBILICAL HERNIA REPAIR  No date: CATARACT EXTRACTION WITH IOL Bilateral  Smoking status: Former Smoker                                                              Packs/day: 1.00      Years: 40.00        Types: Cigarettes     Quit date: 1/1/1989  Smokeless tobacco: Never Used                      Alcohol use: No               Comment: sober  for years    Review of patient's family history indicates:    Diabetes                       Father                    Heart Disease                  Mother                    Heart Disease                  Brother                     Comment: arrhythmia     HENT: Negative.    Eyes: Negative.    Respiratory: Negative.  Negative for cough and hemoptysis.    Cardiovascular: Negative.  Negative for chest pain and palpitations.   Gastrointestinal: Negative.  Negative for abdominal pain and constipation.   Genitourinary: Negative.  Negative for dysuria and urgency.   Musculoskeletal: Positive for arthralgias, joint pain and myalgias. Negative for neck pain.   Skin: Negative.  Negative for rash.   Neurological: Negative.  Negative for dizziness and headaches.   Endo/Heme/Allergies: Negative.    Psychiatric/Behavioral: Negative.  Negative for suicidal ideas.          Objective:     /80   Pulse 60   Temp 36.7 °C (98.1 °F)   Resp 16   Wt 105.3 kg (232 lb 3.2 oz)   SpO2 96%   BMI 34.79 kg/m²      Physical Exam   Constitutional: He is oriented to person, place, and time. He appears well-developed and well-nourished. No distress.   HENT:   Head: Normocephalic and atraumatic.   Mouth/Throat: Oropharynx is clear and moist. No oropharyngeal exudate.   Eyes: Pupils are equal, round, and reactive to light.   Cardiovascular: Normal rate, regular rhythm, normal heart sounds and intact distal pulses.  Exam reveals no gallop and no friction rub.    No murmur heard.  Pulmonary/Chest: Effort normal and breath sounds normal. No respiratory distress. He has no wheezes. He has no rales. He exhibits no tenderness.   Musculoskeletal:        Left hip: He exhibits tenderness. He exhibits normal range of motion.   Neurological: He is alert and oriented to person, place, and time.   Skin: He is not diaphoretic.   Psychiatric: He has a normal mood and affect. His behavior is normal. Judgment and thought content normal.   Nursing note  and vitals reviewed.              Assessment/Plan:     1. Medicare annual wellness visit, initial    - Initial Wellness Visit - Includes PPPS ()  - OCCULT BLOOD FECES IMMUNOASSAY (FIT); Future    2. PAD (peripheral artery disease) (CMS-HCC)    - REFERRAL TO VASCULAR SURGERY  - LIPID PROFILE; Future  - COMP METABOLIC PANEL; Future  - FREE THYROXINE; Future  - TRIIDOTHYRONINE; Future  - TSH; Future  - VITAMIN D,25 HYDROXY; Future  - CBC WITHOUT DIFFERENTIAL; Future    3. Well adult exam    - Initial Wellness Visit - Includes PPPS ()  - OCCULT BLOOD FECES IMMUNOASSAY (FIT); Future    4. Mixed hyperlipidemia    - LIPID PROFILE; Future  - COMP METABOLIC PANEL; Future  - FREE THYROXINE; Future  - TRIIDOTHYRONINE; Future  - TSH; Future  - VITAMIN D,25 HYDROXY; Future  - CBC WITHOUT DIFFERENTIAL; Future    5. Essential hypertension      6. Left hip pain  Likely due to oa will get xray for eval and trial of nsaids  - DX-HIP-UNILATERAL-WITH PELVIS-1 VIEW LEFT; Future  - meloxicam (MOBIC) 7.5 MG Tab; Take 1 Tab by mouth every day.  Dispense: 30 Tab; Refill: 3

## 2018-02-15 ENCOUNTER — TELEPHONE (OUTPATIENT)
Dept: MEDICAL GROUP | Facility: PHYSICIAN GROUP | Age: 73
End: 2018-02-15

## 2018-02-15 NOTE — TELEPHONE ENCOUNTER
Phone Number Called: [phone] (home)        Message: LVM for pt to schedule to discuss lab results     Left Message for patient to call back: yes

## 2018-02-15 NOTE — TELEPHONE ENCOUNTER
----- Message from Bobby Parker M.D. sent at 2/15/2018 11:40 AM PST -----  This patient needs an appointment within the next week. Please schedule this with the patient so we may discuss their lab results

## 2018-03-03 DIAGNOSIS — M25.552 BILATERAL HIP PAIN: ICD-10-CM

## 2018-03-03 DIAGNOSIS — M25.551 BILATERAL HIP PAIN: ICD-10-CM

## 2018-03-05 RX ORDER — FENOFIBRATE 160 MG/1
TABLET ORAL
Qty: 90 TAB | Refills: 0 | Status: SHIPPED | OUTPATIENT
Start: 2018-03-05 | End: 2018-05-31 | Stop reason: SDUPTHER

## 2018-03-05 NOTE — TELEPHONE ENCOUNTER
Was the patient seen in the last year in this department? Yes     Does patient have an active prescription for medications requested? No     Received Request Via: Pharmacy     Last Visit: 02/13/18  Last Labs: 02/14/18  Next Appt: 03/06/18

## 2018-03-06 ENCOUNTER — OFFICE VISIT (OUTPATIENT)
Dept: MEDICAL GROUP | Facility: PHYSICIAN GROUP | Age: 73
End: 2018-03-06
Payer: MEDICARE

## 2018-03-06 VITALS
SYSTOLIC BLOOD PRESSURE: 142 MMHG | DIASTOLIC BLOOD PRESSURE: 60 MMHG | BODY MASS INDEX: 33.77 KG/M2 | RESPIRATION RATE: 14 BRPM | WEIGHT: 228 LBS | OXYGEN SATURATION: 95 % | HEART RATE: 83 BPM | TEMPERATURE: 98.1 F | HEIGHT: 69 IN

## 2018-03-06 DIAGNOSIS — E78.2 MIXED HYPERLIPIDEMIA: ICD-10-CM

## 2018-03-06 DIAGNOSIS — M25.551 BILATERAL HIP PAIN: ICD-10-CM

## 2018-03-06 DIAGNOSIS — M25.552 BILATERAL HIP PAIN: ICD-10-CM

## 2018-03-06 DIAGNOSIS — I10 ESSENTIAL HYPERTENSION: ICD-10-CM

## 2018-03-06 DIAGNOSIS — E55.9 VITAMIN D DEFICIENCY: ICD-10-CM

## 2018-03-06 DIAGNOSIS — E66.9 OBESITY (BMI 30-39.9): ICD-10-CM

## 2018-03-06 DIAGNOSIS — I73.9 PAD (PERIPHERAL ARTERY DISEASE) (HCC): ICD-10-CM

## 2018-03-06 PROCEDURE — 99214 OFFICE O/P EST MOD 30 MIN: CPT | Performed by: FAMILY MEDICINE

## 2018-03-06 ASSESSMENT — ENCOUNTER SYMPTOMS
RESPIRATORY NEGATIVE: 1
COUGH: 0
FEVER: 0
EYES NEGATIVE: 1
DIZZINESS: 0
NECK PAIN: 0
PSYCHIATRIC NEGATIVE: 1
NEUROLOGICAL NEGATIVE: 1
HEMOPTYSIS: 0
CONSTIPATION: 0
CARDIOVASCULAR NEGATIVE: 1
CONSTITUTIONAL NEGATIVE: 1
MYALGIAS: 0
PALPITATIONS: 0
HEADACHES: 0
GASTROINTESTINAL NEGATIVE: 1
CHILLS: 0

## 2018-03-06 NOTE — PROGRESS NOTES
Subjective:      Shun Alvarez is a 73 y.o. male who presents with Hyperlipidemia and Hip Pain (right side X 3 weeks)            1. PAD (peripheral artery disease) (CMS-HCC)  Has appt with vascular next month    2. Mixed hyperlipidemia  Currently treated for HLD, taking meds with no new myalgias or joint pain, watching fats in diet  controlled      3. Essential hypertension  Currently treated for HTN, taking meds with no CP or sob, monitors bp at home periodically. controlled      4. Obesity (BMI 30-39.9)    - Patient identified as having weight management issue.  Appropriate orders and counseling given.    5. Bilateral hip pain  Continue with meds and tylenol, will add pt to regimen and if not better after a trial will send to ortho    6. Vitamin D deficiency  Low on labs will replace  - cholecalciferol ([employer] VIT D 5000 HIGH-POTENCY) 5000 UNIT Cap; Take 1 Cap by mouth every day.  Dispense: 30 Cap; Refill: 6    Past Medical History:  No date: Cataract      Comment: removed bilat  No date: Hyperlipidemia  No date: Hypertension  No date: PAD (peripheral artery disease)  08-: Pain      Comment: left leg w/walking, 0/10  No date: Retinopathy of left eye      Comment: due to previous surgery  No date: Snoring  Past Surgical History:  8/10/2016: FEMORAL ENDARTERECTOMY Left      Comment: Procedure: FEMORAL ENDARTERECTOMY COMMON;                 Surgeon: Miky Whitney M.D.;  Location:                SURGERY [facility];  Service:   6/15/2016: RECOVERY      Comment: Procedure: VASCULAR CASE-MELVIN-ABDOMINAL                AORTOGRAM WITH RUNOFF, POSSIBLE INTERVENTION                [id number], [id number], [id number], ATHEROSCLEROSIS LOWER                EXTREMITIES I70.209;  Surgeon: Vishal                Surgery;  Location: SURGERY PRE-POST 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    Review of patient's family history indicates:    Diabetes                       Father                    Heart Disease                  Mother                    Heart Disease                  Brother                     Comment: arrhythmia      Current Outpatient Prescriptions: •  cholecalciferol ([employer]S VIT D 5000 HIGH-POTENCY) 5000 UNIT Cap, Take 1 Cap by mouth every day., Disp: 30 Cap, Rfl: 6•  fenofibrate (TRIGLIDE) 160 MG tablet, TAKE ONE TABLET BY MOUTH DAILY, Disp: 90 Tab, Rfl: 0•  meloxicam (MOBIC) 7.5 MG Tab, Take 1 Tab by mouth every day., Disp: 30 Tab, Rfl: 3•  pentoxifylline CR (TRENTAL) 400 MG CR tablet, TAKE ONE TABLET BY MOUTH THREE TIMES A DAY WITH MEALS, Disp: 270 Tab, Rfl: 0•  lisinopril-hydrochlorothiazide (PRINZIDE, ZESTORETIC) 20-25 MG per tablet, TAKE ONE TABLET BY MOUTH DAILY, Disp: 90 Tab, Rfl: 0•  metoprolol (LOPRESSOR) 50 MG Tab, TAKE ONE TABLET BY MOUTH TWICE A DAY, Disp: 180 Tab, Rfl: 1•  amlodipine (NORVASC) 10 MG Tab, TAKE ONE TABLET BY MOUTH DAILY, Disp: 90 Tab, Rfl: 1•  trazodone (DESYREL) 50 MG Tab, TAKE ONE TO TWO TABLETS BY MOUTH EVERY NIGHT AT BEDTIME AS NEEDED FOR SLEEP, Disp: 180 Tab, Rfl: 0•  aspirin (ASA) 81 MG Chew Tab chewable tablet, Take 81 mg by mouth every day., Disp: , Rfl: •  timolol (TIMOPTIC) 0.5 % Solution, Place 1 Drop in both eyes every day., Disp: , Rfl: •  dorzolamide (TRUSOPT) 2 % Solution, Place 1 Drop in both eyes 2 Times a Day., Disp: , Rfl:     Patient was instructed on the use of medications, either prescriptions or OTC and informed on when the appropriate follow up time period should be. In addition, patient was also instructed that should any acute worsening occur that they should notify this clinic asap or call  911.            Review of Systems   Constitutional: Negative.  Negative for chills and fever.        Past Medical History:  No date: Cataract      Comment: removed bilat  No date: Hyperlipidemia  No date: Hypertension  No date: PAD (peripheral artery disease)  08-: Pain      Comment: left leg w/walking, 0/10  No date: Retinopathy of left eye      Comment: due to previous surgery  No date: Snoring  Past Surgical History:  8/10/2016: FEMORAL ENDARTERECTOMY Left      Comment: Procedure: FEMORAL ENDARTERECTOMY COMMON;                 Surgeon: Miky Whitney M.D.;  Location:                SURGERY [facility];  Service:   6/15/2016: RECOVERY      Comment: Procedure: VASCULAR CASE-WHITNEY-ABDOMINAL                AORTOGRAM WITH RUNOFF, POSSIBLE INTERVENTION                [id number], 53049, 03782, ATHEROSCLEROSIS LOWER                EXTREMITIES I70.209;  Surgeon: [facility]                [facility];  Location: SURGERY PRE-POST PROC UNIT                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    Eyes: Negative.    Respiratory: Negative.  Negative for cough and hemoptysis.    Cardiovascular: Negative.  Negative for chest pain and palpitations.   Gastrointestinal: Negative.  Negative for constipation.   Genitourinary: Negative.  Negative for dysuria and urgency.   Musculoskeletal: Positive for joint pain. Negative for myalgias  "and neck pain.   Skin: Negative.  Negative for rash.   Neurological: Negative.  Negative for dizziness and headaches.   Endo/Heme/Allergies: Negative.    Psychiatric/Behavioral: Negative.  Negative for suicidal ideas.          Objective:     /60   Pulse 83   Temp 36.7 °C (98.1 °F)   Resp 14   Ht 1.74 m (5' 8.5\")   Wt 103.4 kg (228 lb)   SpO2 95%   BMI 34.16 kg/m²      Physical Exam   Constitutional: He is oriented to person, place, and time. He appears well-developed and well-nourished. No distress.   HENT:   Head: Normocephalic and atraumatic.   Mouth/Throat: Oropharynx is clear and moist. No oropharyngeal exudate.   Eyes: Pupils are equal, round, and reactive to light.   Cardiovascular: Normal rate, regular rhythm, normal heart sounds and intact distal pulses.  Exam reveals no gallop and no friction rub.    No murmur heard.  Pulmonary/Chest: Effort normal and breath sounds normal. No respiratory distress. He has no wheezes. He has no rales. He exhibits no tenderness.   Musculoskeletal:        Lumbar back: He exhibits pain.   Neurological: He is alert and oriented to person, place, and time.   Skin: He is not diaphoretic.   Psychiatric: He has a normal mood and affect. His behavior is normal. Judgment and thought content normal.   Nursing note and vitals reviewed.              Assessment/Plan:     1. PAD (peripheral artery disease) (CMS-HCC)      2. Mixed hyperlipidemia      3. Essential hypertension      4. Obesity (BMI 30-39.9)    - Patient identified as having weight management issue.  Appropriate orders and counseling given.    5. Bilateral hip pain      6. Vitamin D deficiency    - cholecalciferol (CVS VIT D 5000 HIGH-POTENCY) 5000 UNIT Cap; Take 1 Cap by mouth every day.  Dispense: 30 Cap; Refill: 6          "

## 2018-03-08 ENCOUNTER — TELEPHONE (OUTPATIENT)
Dept: MEDICAL GROUP | Facility: PHYSICIAN GROUP | Age: 73
End: 2018-03-08

## 2018-03-08 NOTE — TELEPHONE ENCOUNTER
----- Message from Shun Alvarez sent at 3/7/2018  7:45 PM PST -----  Regarding: Test Result Question  Contact: [phone]  Dr Parker, I am Shun's wife, Nishi emailing you with some concerns.  I just wanted to make sure that the Xrays definitely  show arthritis.  I am asking because the pain seems to be moving around.  Started with his left hip, moved to his right hip and now he has intense pain in his right upper leg.  Is this consistent with arthritis?

## 2018-03-09 NOTE — TELEPHONE ENCOUNTER
----- Message from Shun Alvarez sent at 3/8/2018 12:19 PM PST -----  Regarding: RE:(No subject)  Contact: [phone]  Thank you Wendy Hoffmann and I have talked and he thinks he would like to get a referral to an orthopedic . Their is one in [facility].  Shun has just gotten worse and he doesn't think that her can do PT right now.  ----- Message -----  From: Sharon Acharya, Med Ass't  Sent: 3/8/2018  9:47 AM PST  To: Shun Alvarez  Subject: (No subject)  Avery Almodovar,  Yes, it is consistent with arthritis.

## 2018-03-15 RX ORDER — GABAPENTIN 100 MG/1
100 CAPSULE ORAL 3 TIMES DAILY PRN
Qty: 90 CAP | Refills: 1 | Status: SHIPPED | OUTPATIENT
Start: 2018-03-15 | End: 2018-05-26 | Stop reason: SDUPTHER

## 2018-03-16 ENCOUNTER — TELEPHONE (OUTPATIENT)
Dept: MEDICAL GROUP | Facility: PHYSICIAN GROUP | Age: 73
End: 2018-03-16

## 2018-03-16 NOTE — TELEPHONE ENCOUNTER
Phone Number Called: [phone] (home)     Message: Called and LVM for pt. To call and schedule their annual wellness apt.     Left Message for patient to call back: yes

## 2018-04-02 ENCOUNTER — TELEPHONE (OUTPATIENT)
Dept: MEDICAL GROUP | Facility: PHYSICIAN GROUP | Age: 73
End: 2018-04-02

## 2018-04-02 NOTE — TELEPHONE ENCOUNTER
Dr. Cortes from [facility] called in regards to pt. He stated that pt has a suspicious S-1 lesion that needs a full work up. He stated that he is going to refer pt to spine surgeon. If we should need anything or have any further questions we can call them at [phone]

## 2018-04-10 ENCOUNTER — HOSPITAL ENCOUNTER (OUTPATIENT)
Dept: LAB | Facility: MEDICAL CENTER | Age: 73
End: 2018-04-10
Attending: ORTHOPAEDIC SURGERY
Payer: MEDICARE

## 2018-04-10 LAB
BASOPHILS # BLD AUTO: 0.7 % (ref 0–1.8)
BASOPHILS # BLD: 0.07 K/UL (ref 0–0.12)
EOSINOPHIL # BLD AUTO: 0.33 K/UL (ref 0–0.51)
EOSINOPHIL NFR BLD: 3.4 % (ref 0–6.9)
ERYTHROCYTE [DISTWIDTH] IN BLOOD BY AUTOMATED COUNT: 43.3 FL (ref 35.9–50)
ERYTHROCYTE [SEDIMENTATION RATE] IN BLOOD BY WESTERGREN METHOD: 20 MM/HOUR (ref 0–20)
HCT VFR BLD AUTO: 52.7 % (ref 42–52)
HGB BLD-MCNC: 17.7 G/DL (ref 14–18)
IMM GRANULOCYTES # BLD AUTO: 0.05 K/UL (ref 0–0.11)
IMM GRANULOCYTES NFR BLD AUTO: 0.5 % (ref 0–0.9)
LYMPHOCYTES # BLD AUTO: 1.32 K/UL (ref 1–4.8)
LYMPHOCYTES NFR BLD: 13.5 % (ref 22–41)
MCH RBC QN AUTO: 30.8 PG (ref 27–33)
MCHC RBC AUTO-ENTMCNC: 33.6 G/DL (ref 33.7–35.3)
MCV RBC AUTO: 91.8 FL (ref 81.4–97.8)
MONOCYTES # BLD AUTO: 0.73 K/UL (ref 0–0.85)
MONOCYTES NFR BLD AUTO: 7.5 % (ref 0–13.4)
NEUTROPHILS # BLD AUTO: 7.28 K/UL (ref 1.82–7.42)
NEUTROPHILS NFR BLD: 74.4 % (ref 44–72)
NRBC # BLD AUTO: 0 K/UL
NRBC BLD-RTO: 0 /100 WBC
PLATELET # BLD AUTO: 175 K/UL (ref 164–446)
PMV BLD AUTO: 11.8 FL (ref 9–12.9)
RBC # BLD AUTO: 5.74 M/UL (ref 4.7–6.1)
WBC # BLD AUTO: 9.8 K/UL (ref 4.8–10.8)

## 2018-04-10 PROCEDURE — 36415 COLL VENOUS BLD VENIPUNCTURE: CPT

## 2018-04-10 PROCEDURE — 84165 PROTEIN E-PHORESIS SERUM: CPT

## 2018-04-10 PROCEDURE — 85025 COMPLETE CBC W/AUTO DIFF WBC: CPT

## 2018-04-10 PROCEDURE — 85652 RBC SED RATE AUTOMATED: CPT

## 2018-04-10 PROCEDURE — 83883 ASSAY NEPHELOMETRY NOT SPEC: CPT

## 2018-04-10 PROCEDURE — 84160 ASSAY OF PROTEIN ANY SOURCE: CPT

## 2018-04-10 PROCEDURE — 84156 ASSAY OF PROTEIN URINE: CPT

## 2018-04-13 LAB
ALBUMIN 24H UR QL ELPH: DETECTED
ALBUMIN SERPL-MCNC: 4.35 G/DL (ref 3.75–5.01)
ALPHA1 GLOB 24H UR QL ELPH: DETECTED
ALPHA1 GLOB SERPL ELPH-MCNC: 0.33 G/DL (ref 0.19–0.46)
ALPHA2 GLOB 24H UR QL ELPH: DETECTED
ALPHA2 GLOB SERPL ELPH-MCNC: 0.93 G/DL (ref 0.48–1.05)
B-GLOBULIN SERPL ELPH-MCNC: 0.82 G/DL (ref 0.48–1.1)
B-GLOBULIN UR QL ELPH: DETECTED
COLLECT DURATION TIME SPEC: ABNORMAL H
EER PROT ELECT SER Q1092: ABNORMAL
GAMMA GLOB SERPL ELPH-MCNC: 1.77 G/DL (ref 0.62–1.51)
GAMMA GLOB UR QL ELPH: DETECTED
INTERPRETATION SERPL IFE-IMP: ABNORMAL
INTERPRETATION UR IFE-IMP: ABNORMAL
KAPPA LC FREE UR-MCNC: 2.81 MG/DL (ref 0.14–2.42)
KAPPA LC FREE/LAMBDA FREE UR: 2.63 RATIO (ref 2.04–10.37)
LAMBDA LC FREE UR-MCNC: 1.07 MG/DL (ref 0.02–0.67)
PROT 24H UR-MRATE: ABNORMAL MG/D (ref 10–140)
PROT SERPL-MCNC: 8.2 G/DL (ref 6–8.3)
TOTAL VOLUME 1105: ABNORMAL ML

## 2018-05-23 DIAGNOSIS — E78.5 HYPERLIPIDEMIA, UNSPECIFIED HYPERLIPIDEMIA TYPE: ICD-10-CM

## 2018-05-23 RX ORDER — LISINOPRIL AND HYDROCHLOROTHIAZIDE 25; 20 MG/1; MG/1
TABLET ORAL
Qty: 90 TAB | Refills: 0 | Status: SHIPPED | OUTPATIENT
Start: 2018-05-23 | End: 2018-08-26 | Stop reason: SDUPTHER

## 2018-05-23 NOTE — TELEPHONE ENCOUNTER
Was the patient seen in the last year in this department? Yes     Does patient have an active prescription for medications requested? Yes     Received Request Via: Pharmacy     Last Visit: 3/6/18  Last Labs: 4/10/18

## 2018-05-26 DIAGNOSIS — Z76.0 MEDICATION REFILL: ICD-10-CM

## 2018-05-29 RX ORDER — GABAPENTIN 100 MG/1
CAPSULE ORAL
Qty: 90 CAP | Refills: 0 | Status: SHIPPED | OUTPATIENT
Start: 2018-05-29 | End: 2018-06-30 | Stop reason: SDUPTHER

## 2018-05-31 DIAGNOSIS — M25.551 BILATERAL HIP PAIN: ICD-10-CM

## 2018-05-31 DIAGNOSIS — M25.552 BILATERAL HIP PAIN: ICD-10-CM

## 2018-05-31 RX ORDER — FENOFIBRATE 160 MG/1
TABLET ORAL
Qty: 90 TAB | Refills: 0 | Status: SHIPPED | OUTPATIENT
Start: 2018-05-31 | End: 2018-08-26 | Stop reason: SDUPTHER

## 2018-06-30 DIAGNOSIS — Z76.0 MEDICATION REFILL: ICD-10-CM

## 2018-07-02 RX ORDER — GABAPENTIN 100 MG/1
CAPSULE ORAL
Qty: 90 CAP | Refills: 0 | Status: SHIPPED | OUTPATIENT
Start: 2018-07-02

## 2018-07-27 NOTE — TELEPHONE ENCOUNTER
Was the patient seen in the last year in this department? Yes    Does patient have an active prescription for medications requested? No     Received Request Via: Pharmacy     Last Visit: 03/06/18  Last Labs: 04/10/18  Next Appt: N/A

## 2018-07-30 RX ORDER — PENTOXIFYLLINE 400 MG/1
TABLET, EXTENDED RELEASE ORAL
Qty: 270 TAB | Refills: 0 | Status: SHIPPED | OUTPATIENT
Start: 2018-07-30 | End: 2018-12-27 | Stop reason: SDUPTHER

## 2018-08-26 DIAGNOSIS — M25.552 BILATERAL HIP PAIN: ICD-10-CM

## 2018-08-26 DIAGNOSIS — M25.551 BILATERAL HIP PAIN: ICD-10-CM

## 2018-08-26 DIAGNOSIS — E78.5 HYPERLIPIDEMIA, UNSPECIFIED HYPERLIPIDEMIA TYPE: ICD-10-CM

## 2018-08-27 RX ORDER — LISINOPRIL AND HYDROCHLOROTHIAZIDE 25; 20 MG/1; MG/1
TABLET ORAL
Qty: 90 TAB | Refills: 1 | Status: SHIPPED | OUTPATIENT
Start: 2018-08-27 | End: 2019-06-12 | Stop reason: SDUPTHER

## 2018-08-27 RX ORDER — FENOFIBRATE 160 MG/1
TABLET ORAL
Qty: 90 TAB | Refills: 1 | Status: SHIPPED | OUTPATIENT
Start: 2018-08-27 | End: 2019-03-14 | Stop reason: SDUPTHER

## 2018-10-15 RX ORDER — AMLODIPINE BESYLATE 10 MG/1
TABLET ORAL
Qty: 90 TAB | Refills: 0 | Status: SHIPPED | OUTPATIENT
Start: 2018-10-15 | End: 2018-11-01

## 2018-10-15 NOTE — TELEPHONE ENCOUNTER
Was the patient seen in the last year in this department? Yes    Does patient have an active prescription for medications requested? Yes    Received Request Via: Pharmacy         Last visit: 3/6/18  Last labs:4/10/18

## 2018-10-21 DIAGNOSIS — I73.9 PAD (PERIPHERAL ARTERY DISEASE) (HCC): ICD-10-CM

## 2018-10-21 DIAGNOSIS — I10 ESSENTIAL HYPERTENSION: ICD-10-CM

## 2018-10-21 DIAGNOSIS — E78.2 MIXED HYPERLIPIDEMIA: ICD-10-CM

## 2018-10-22 ENCOUNTER — TELEPHONE (OUTPATIENT)
Dept: MEDICAL GROUP | Facility: PHYSICIAN GROUP | Age: 73
End: 2018-10-22

## 2018-10-22 RX ORDER — METOPROLOL TARTRATE 50 MG/1
TABLET, FILM COATED ORAL
Qty: 180 TAB | Refills: 0 | Status: SHIPPED | OUTPATIENT
Start: 2018-10-22 | End: 2019-01-28 | Stop reason: SDUPTHER

## 2018-10-22 NOTE — TELEPHONE ENCOUNTER
Patients wife called and left  regarding the patients labs. She is wanting to know when he can do labs again, he is losing weight. There is an concern.

## 2018-10-25 ENCOUNTER — HOSPITAL ENCOUNTER (OUTPATIENT)
Dept: LAB | Facility: MEDICAL CENTER | Age: 73
End: 2018-10-25
Attending: FAMILY MEDICINE
Payer: MEDICARE

## 2018-10-25 DIAGNOSIS — I73.9 PAD (PERIPHERAL ARTERY DISEASE) (HCC): ICD-10-CM

## 2018-10-25 DIAGNOSIS — I10 ESSENTIAL HYPERTENSION: ICD-10-CM

## 2018-10-25 DIAGNOSIS — E78.2 MIXED HYPERLIPIDEMIA: ICD-10-CM

## 2018-10-25 LAB
25(OH)D3 SERPL-MCNC: 42 NG/ML (ref 30–100)
ALBUMIN SERPL BCP-MCNC: 3.6 G/DL (ref 3.2–4.9)
ALBUMIN/GLOB SERPL: 1 G/DL
ALP SERPL-CCNC: 50 U/L (ref 30–99)
ALT SERPL-CCNC: 19 U/L (ref 2–50)
ANION GAP SERPL CALC-SCNC: 8 MMOL/L (ref 0–11.9)
AST SERPL-CCNC: 23 U/L (ref 12–45)
BASOPHILS # BLD AUTO: 0.4 % (ref 0–1.8)
BASOPHILS # BLD: 0.02 K/UL (ref 0–0.12)
BILIRUB SERPL-MCNC: 0.5 MG/DL (ref 0.1–1.5)
BUN SERPL-MCNC: 44 MG/DL (ref 8–22)
CALCIUM SERPL-MCNC: 10 MG/DL (ref 8.5–10.5)
CHLORIDE SERPL-SCNC: 111 MMOL/L (ref 96–112)
CHOLEST SERPL-MCNC: 122 MG/DL (ref 100–199)
CO2 SERPL-SCNC: 27 MMOL/L (ref 20–33)
CREAT SERPL-MCNC: 1.71 MG/DL (ref 0.5–1.4)
EOSINOPHIL # BLD AUTO: 0.21 K/UL (ref 0–0.51)
EOSINOPHIL NFR BLD: 4.2 % (ref 0–6.9)
ERYTHROCYTE [DISTWIDTH] IN BLOOD BY AUTOMATED COUNT: 49.7 FL (ref 35.9–50)
FASTING STATUS PATIENT QL REPORTED: NORMAL
GLOBULIN SER CALC-MCNC: 3.7 G/DL (ref 1.9–3.5)
GLUCOSE SERPL-MCNC: 103 MG/DL (ref 65–99)
HCT VFR BLD AUTO: 36 % (ref 42–52)
HDLC SERPL-MCNC: 28 MG/DL
HGB BLD-MCNC: 11.4 G/DL (ref 14–18)
IMM GRANULOCYTES # BLD AUTO: 0.03 K/UL (ref 0–0.11)
IMM GRANULOCYTES NFR BLD AUTO: 0.6 % (ref 0–0.9)
LDLC SERPL CALC-MCNC: 44 MG/DL
LYMPHOCYTES # BLD AUTO: 0.59 K/UL (ref 1–4.8)
LYMPHOCYTES NFR BLD: 11.8 % (ref 22–41)
MCH RBC QN AUTO: 30.9 PG (ref 27–33)
MCHC RBC AUTO-ENTMCNC: 31.7 G/DL (ref 33.7–35.3)
MCV RBC AUTO: 97.6 FL (ref 81.4–97.8)
MONOCYTES # BLD AUTO: 0.47 K/UL (ref 0–0.85)
MONOCYTES NFR BLD AUTO: 9.4 % (ref 0–13.4)
NEUTROPHILS # BLD AUTO: 3.67 K/UL (ref 1.82–7.42)
NEUTROPHILS NFR BLD: 73.6 % (ref 44–72)
NRBC # BLD AUTO: 0 K/UL
NRBC BLD-RTO: 0 /100 WBC
PLATELET # BLD AUTO: 113 K/UL (ref 164–446)
PMV BLD AUTO: 11.9 FL (ref 9–12.9)
POTASSIUM SERPL-SCNC: 4.2 MMOL/L (ref 3.6–5.5)
PROT SERPL-MCNC: 7.3 G/DL (ref 6–8.2)
RBC # BLD AUTO: 3.69 M/UL (ref 4.7–6.1)
SODIUM SERPL-SCNC: 146 MMOL/L (ref 135–145)
T3 SERPL-MCNC: 86.4 NG/DL (ref 60–181)
T4 FREE SERPL-MCNC: 1.07 NG/DL (ref 0.53–1.43)
TRIGL SERPL-MCNC: 252 MG/DL (ref 0–149)
TSH SERPL DL<=0.005 MIU/L-ACNC: 1.89 UIU/ML (ref 0.38–5.33)
WBC # BLD AUTO: 5 K/UL (ref 4.8–10.8)

## 2018-10-25 PROCEDURE — 80053 COMPREHEN METABOLIC PANEL: CPT

## 2018-10-25 PROCEDURE — 82306 VITAMIN D 25 HYDROXY: CPT

## 2018-10-25 PROCEDURE — 36415 COLL VENOUS BLD VENIPUNCTURE: CPT

## 2018-10-25 PROCEDURE — 84439 ASSAY OF FREE THYROXINE: CPT

## 2018-10-25 PROCEDURE — 84480 ASSAY TRIIODOTHYRONINE (T3): CPT

## 2018-10-25 PROCEDURE — 85025 COMPLETE CBC W/AUTO DIFF WBC: CPT

## 2018-10-25 PROCEDURE — 80061 LIPID PANEL: CPT

## 2018-10-25 PROCEDURE — 84443 ASSAY THYROID STIM HORMONE: CPT

## 2018-11-01 ENCOUNTER — OFFICE VISIT (OUTPATIENT)
Dept: MEDICAL GROUP | Facility: PHYSICIAN GROUP | Age: 73
End: 2018-11-01
Payer: MEDICARE

## 2018-11-01 VITALS
BODY MASS INDEX: 27.7 KG/M2 | WEIGHT: 187 LBS | SYSTOLIC BLOOD PRESSURE: 136 MMHG | DIASTOLIC BLOOD PRESSURE: 60 MMHG | TEMPERATURE: 99.7 F | RESPIRATION RATE: 12 BRPM | OXYGEN SATURATION: 95 % | HEIGHT: 69 IN | HEART RATE: 84 BPM

## 2018-11-01 DIAGNOSIS — G89.29 OTHER CHRONIC PAIN: ICD-10-CM

## 2018-11-01 DIAGNOSIS — C90.30 PLASMACYTOMA OF BONE (HCC): ICD-10-CM

## 2018-11-01 DIAGNOSIS — Z79.891 CHRONIC PRESCRIPTION OPIATE USE: ICD-10-CM

## 2018-11-01 PROCEDURE — 99214 OFFICE O/P EST MOD 30 MIN: CPT | Performed by: FAMILY MEDICINE

## 2018-11-01 ASSESSMENT — ENCOUNTER SYMPTOMS
FEVER: 0
PALPITATIONS: 0
DIZZINESS: 0
HEMOPTYSIS: 0
NECK PAIN: 1
HEADACHES: 0
BLURRED VISION: 0
GASTROINTESTINAL NEGATIVE: 1
HEARTBURN: 0
NEUROLOGICAL NEGATIVE: 1
CONSTITUTIONAL NEGATIVE: 1
BRUISES/BLEEDS EASILY: 0
CHILLS: 0
COUGH: 0
DOUBLE VISION: 0
FALLS: 1
CARDIOVASCULAR NEGATIVE: 1
PSYCHIATRIC NEGATIVE: 1
NAUSEA: 0
MYALGIAS: 1
RESPIRATORY NEGATIVE: 1
TINGLING: 0
DEPRESSION: 0
EYES NEGATIVE: 1
BACK PAIN: 1

## 2018-11-01 NOTE — PROGRESS NOTES
Subjective:      Shun Alvarez is a 73 y.o. male who presents with Blood Pressure Problem (review medications) and Arm Injury (right arm)            1. Plasmacytoma of bone (HCC)  Finished with 24 rad onc treatments to the sacrum  Has f/u with dr. cui    2. Other chronic pain  Had a pain pump implanted for chronic back pain, getting dosage optimalized but much improved  Had a fall and broke right forearm earlier this month, wound healing well and no infection    3. Chronic prescription opiate use  Stable    4. htn    With weight loss patient and wife concerned about bp meds  Today low normal bp will d/c norvasc and continue with metoprolol and lisinopril and f/u in 2 mo for bp check and check at home    Past Medical History:  No date: Cataract      Comment:  removed bilat  No date: Hyperlipidemia  No date: Hypertension  No date: PAD (peripheral artery disease)  08-: Pain      Comment:  left leg w/walking, 0/10  No date: Retinopathy of left eye      Comment:  due to previous surgery  No date: Snoring  Past Surgical History:  8/10/2016: FEMORAL ENDARTERECTOMY; Left      Comment:  Procedure: FEMORAL ENDARTERECTOMY COMMON;  Surgeon: Miky Whitney M.D.;  Location: SURGERY [facility];                 Service:   6/15/2016: RECOVERY      Comment:  Procedure: VASCULAR CASE-MELVIN-ABDOMINAL AORTOGRAM WITH               RUNOFF, POSSIBLE INTERVENTION 85803, 55379, 59551,                ATHEROSCLEROSIS LOWER EXTREMITIES I70.209;  Surgeon:                [facility];  Location: SURGERY PRE-POST [facility]                [facility] [place];  Service:   2000: UMBILICAL HERNIA REPAIR  No date: CATARACT EXTRACTION WITH IOL; Bilateral  Smoking status: Former Smoker                                                              Packs/day: 1.00      Years: 40.00        Types: Cigarettes     Quit date: 1/1/1989  Smokeless tobacco: Never Used                      Alcohol use: No               Comment: sober for  years    Review of patient's family history indicates:  Problem: Diabetes      Relation: Father       Age of Onset: (Not Specified)   Problem: Heart Disease      Relation: Mother       Age of Onset: (Not Specified)   Problem: Heart Disease      Relation: Brother       Age of Onset: (Not Specified)       Comment: arrhythmia      Current Outpatient Prescriptions: •  metoprolol (LOPRESSOR) 50 MG Tab, TAKE ONE TABLET BY MOUTH TWICE A DAY, Disp: 180 Tab, Rfl: 0•  lisinopril-hydrochlorothiazide (PRINZIDE, ZESTORETIC) 20-25 MG per tablet, TAKE ONE TABLET BY MOUTH DAILY, Disp: 90 Tab, Rfl: 1•  fenofibrate (TRIGLIDE) 160 MG tablet, TAKE ONE TABLET BY MOUTH DAILY, Disp: 90 Tab, Rfl: 1•  pentoxifylline CR (TRENTAL) 400 MG CR tablet, TAKE ONE TABLET BY MOUTH THREE TIMES A DAY WITH MEALS, Disp: 270 Tab, Rfl: 0•  cholecalciferol ([employer]S VIT D 5000 HIGH-POTENCY) 5000 UNIT Cap, Take 1 Cap by mouth every day., Disp: 30 Cap, Rfl: 6•  aspirin (ASA) 81 MG Chew Tab chewable tablet, Take 81 mg by mouth every day., Disp: , Rfl: •  timolol (TIMOPTIC) 0.5 % Solution, Place 1 Drop in both eyes every day., Disp: , Rfl: •  dorzolamide (TRUSOPT) 2 % Solution, Place 1 Drop in both eyes 2 Times a Day., Disp: , Rfl: •  gabapentin (NEURONTIN) 100 MG Cap, TAKE ONE CAPSULE BY MOUTH THREE TIMES A DAY AS NEEDED FOR PAIN (Patient not taking: Reported on 11/1/2018), Disp: 90 Cap, Rfl: 0•  meloxicam (MOBIC) 7.5 MG Tab, Take 1 Tab by mouth every day. (Patient not taking: Reported on 11/1/2018), Disp: 30 Tab, Rfl: 3•  trazodone (DESYREL) 50 MG Tab, TAKE ONE TO TWO TABLETS BY MOUTH EVERY NIGHT AT BEDTIME AS NEEDED FOR SLEEP (Patient not taking: Reported on 11/1/2018), Disp: 180 Tab, Rfl: 0    Patient was instructed on the use of medications, either prescriptions or OTC and informed on when the appropriate follow up time period should be. In addition, patient was also instructed that should any acute worsening occur that they should notify this clinic asap or  "call 911.            Review of Systems   Constitutional: Negative.  Negative for chills and fever.   HENT: Negative.  Negative for hearing loss.    Eyes: Negative.  Negative for blurred vision and double vision.   Respiratory: Negative.  Negative for cough and hemoptysis.    Cardiovascular: Negative.  Negative for chest pain and palpitations.   Gastrointestinal: Negative.  Negative for heartburn and nausea.   Genitourinary: Negative.  Negative for dysuria.   Musculoskeletal: Positive for back pain, falls, joint pain, myalgias and neck pain.   Skin: Negative.  Negative for rash.   Neurological: Negative.  Negative for dizziness, tingling and headaches.   Endo/Heme/Allergies: Negative.  Does not bruise/bleed easily.   Psychiatric/Behavioral: Negative.  Negative for depression and suicidal ideas.   All other systems reviewed and are negative.         Objective:     /60 (BP Location: Left arm, Patient Position: Sitting)   Pulse 84   Temp 37.6 °C (99.7 °F)   Resp 12   Ht 1.74 m (5' 8.5\")   Wt 84.8 kg (187 lb)   SpO2 95%   BMI 28.02 kg/m²      Physical Exam   Constitutional: He is oriented to person, place, and time. He appears well-developed and well-nourished. No distress.   HENT:   Head: Normocephalic and atraumatic.   Mouth/Throat: Oropharynx is clear and moist. No oropharyngeal exudate.   Eyes: Pupils are equal, round, and reactive to light.   Cardiovascular: Normal rate, regular rhythm, normal heart sounds and intact distal pulses.  Exam reveals no gallop and no friction rub.    No murmur heard.  Pulmonary/Chest: Effort normal and breath sounds normal. No respiratory distress. He has no wheezes. He has no rales. He exhibits no tenderness.   Musculoskeletal:        Arms:  Right arm orif wound healing well  LLQ abdomen pain pump site healed   Neurological: He is alert and oriented to person, place, and time.   Skin: He is not diaphoretic.   Psychiatric: He has a normal mood and affect. His behavior is " normal. Judgment and thought content normal.   Nursing note and vitals reviewed.              Assessment/Plan:     1. Plasmacytoma of bone (HCC)      2. Other chronic pain      3. Chronic prescription opiate use

## 2018-11-23 ENCOUNTER — PATIENT OUTREACH (OUTPATIENT)
Dept: HEALTH INFORMATION MANAGEMENT | Facility: OTHER | Age: 73
End: 2018-11-23

## 2018-11-23 NOTE — PROGRESS NOTES
Outcome: pt declined all care gaps and did not want to discuss them with pcp at next appt    [employer] Checked & Epic Updated: not found    [facility] Verified: yes    Attempt # Final

## 2018-12-27 RX ORDER — PENTOXIFYLLINE 400 MG/1
TABLET, EXTENDED RELEASE ORAL
Qty: 270 TAB | Refills: 0 | Status: SHIPPED | OUTPATIENT
Start: 2018-12-27 | End: 2019-05-27 | Stop reason: SDUPTHER

## 2018-12-27 NOTE — TELEPHONE ENCOUNTER
Was the patient seen in the last year in this department? Yes    Does patient have an active prescription for medications requested? Yes    Received Request Via: Pharmacy     Last visit: 11/01/18  Last labs:10/25/18

## 2019-01-14 ENCOUNTER — OFFICE VISIT (OUTPATIENT)
Dept: MEDICAL GROUP | Facility: PHYSICIAN GROUP | Age: 74
End: 2019-01-14
Payer: MEDICARE

## 2019-01-14 VITALS
DIASTOLIC BLOOD PRESSURE: 60 MMHG | HEART RATE: 52 BPM | BODY MASS INDEX: 26.22 KG/M2 | RESPIRATION RATE: 12 BRPM | HEIGHT: 69 IN | SYSTOLIC BLOOD PRESSURE: 142 MMHG | TEMPERATURE: 96.9 F | OXYGEN SATURATION: 96 % | WEIGHT: 177 LBS

## 2019-01-14 DIAGNOSIS — N40.1 BENIGN PROSTATIC HYPERPLASIA WITH LOWER URINARY TRACT SYMPTOMS, SYMPTOM DETAILS UNSPECIFIED: ICD-10-CM

## 2019-01-14 DIAGNOSIS — M25.551 BILATERAL HIP PAIN: ICD-10-CM

## 2019-01-14 DIAGNOSIS — E78.2 MIXED HYPERLIPIDEMIA: ICD-10-CM

## 2019-01-14 DIAGNOSIS — I10 ESSENTIAL HYPERTENSION: ICD-10-CM

## 2019-01-14 DIAGNOSIS — F11.20 OPIOID TYPE DEPENDENCE, CONTINUOUS (HCC): ICD-10-CM

## 2019-01-14 DIAGNOSIS — M25.552 BILATERAL HIP PAIN: ICD-10-CM

## 2019-01-14 DIAGNOSIS — C90.30 PLASMACYTOMA OF BONE (HCC): ICD-10-CM

## 2019-01-14 DIAGNOSIS — I73.9 PAD (PERIPHERAL ARTERY DISEASE) (HCC): ICD-10-CM

## 2019-01-14 PROCEDURE — 8041 PR SCP AHA: Performed by: FAMILY MEDICINE

## 2019-01-14 PROCEDURE — 99214 OFFICE O/P EST MOD 30 MIN: CPT | Mod: 25 | Performed by: FAMILY MEDICINE

## 2019-01-14 RX ORDER — TERAZOSIN 1 MG/1
1 CAPSULE ORAL
Qty: 30 CAP | Refills: 3 | Status: SHIPPED | OUTPATIENT
Start: 2019-01-14 | End: 2019-05-27 | Stop reason: SDUPTHER

## 2019-01-14 RX ORDER — QUETIAPINE FUMARATE 25 MG/1
25 TABLET, FILM COATED ORAL EVERY 12 HOURS
COMMUNITY
Start: 2019-01-07

## 2019-01-14 ASSESSMENT — ENCOUNTER SYMPTOMS
COUGH: 0
CARDIOVASCULAR NEGATIVE: 1
PSYCHIATRIC NEGATIVE: 1
DIZZINESS: 0
NEUROLOGICAL NEGATIVE: 1
DOUBLE VISION: 0
FEVER: 0
HEADACHES: 0
EYES NEGATIVE: 1
NAUSEA: 0
PALPITATIONS: 0
GASTROINTESTINAL NEGATIVE: 1
BLURRED VISION: 0
HEARTBURN: 0
TINGLING: 0
MYALGIAS: 0
CONSTITUTIONAL NEGATIVE: 1
CHILLS: 0
HEMOPTYSIS: 0
BACK PAIN: 1
RESPIRATORY NEGATIVE: 1
BRUISES/BLEEDS EASILY: 0
DEPRESSION: 0

## 2019-01-14 ASSESSMENT — PATIENT HEALTH QUESTIONNAIRE - PHQ9
CLINICAL INTERPRETATION OF PHQ2 SCORE: 2
SUM OF ALL RESPONSES TO PHQ QUESTIONS 1-9: 8
5. POOR APPETITE OR OVEREATING: 1 - SEVERAL DAYS

## 2019-01-15 NOTE — PROGRESS NOTES
Subjective:      Shun Alvarez is a 73 y.o. male who presents with Urinary Frequency; Pain; and Hypertension            Annual Health Assessment Questions:    1.  Are you currently engaging in any exercise or physical activity? No    2.  How would you describe your mood or emotional well-being today? anxious    3.  Have you had any falls in the last year? Yes    4.  Have you noticed any problems with your balance or had difficulty walking? Yes    5.  In the last six months have you experienced any leakage of urine? Yes    6. DPA/Advanced Directive: Patient does not have an Advanced Directive.  A packet and workshop information was given on Advanced Directives.      Above listed falls and balance related to his chronic back pain due to plamacytoma that was treated with radiation last year  He has a opiate pain pump in place in the [facility]  Cancer free at last visit with onc per patient  1. Plasmacytoma of bone (HCC)  F/u with onc , no current treatment other than pain pump for back pain    2. PAD (peripheral artery disease) (HCC)  The patient's current medical issue is well controlled on the current therapy with no new symptoms or worsening      3. Essential hypertension  Currently treated for HTN, taking meds with no CP or sob, monitors bp at home periodically.     - terazosin (HYTRIN) 1 MG Cap; Take 1 Cap by mouth every bedtime.  Dispense: 30 Cap; Refill: 3    4. Bilateral hip pain  Pain pump in place    5. Benign prostatic hyperplasia with lower urinary tract symptoms, symptom details unspecified  Having some issues with nocturnal wakening and getting up to urinate. Does not report any straining or sx of inability to empty bladder  Due to bp being a bit high will try hytrin to help with both issues and f/u in 6 weeks for efficay  - terazosin (HYTRIN) 1 MG Cap; Take 1 Cap by mouth every bedtime.  Dispense: 30 Cap; Refill: 3    6. Opioid type dependence, continuous (HCC)  Per pain clinic    Past Medical  History:  No date: Cataract      Comment:  removed bilat  No date: Hyperlipidemia  No date: Hypertension  No date: PAD (peripheral artery disease)  08-: Pain      Comment:  left leg w/walking, 0/10  No date: Retinopathy of left eye      Comment:  due to previous surgery  No date: Snoring  Past Surgical History:  8/10/2016: FEMORAL ENDARTERECTOMY; Left      Comment:  Procedure: FEMORAL ENDARTERECTOMY COMMON;  Surgeon: Miky Whitney M.D.;  Location: SURGERY [facility];                 Service:   6/15/2016: RECOVERY      Comment:  Procedure: VASCULAR CASE-WHITNEY-ABDOMINAL AORTOGRAM WITH               RUNOFF, POSSIBLE INTERVENTION 25397, 96221, 90101,                ATHEROSCLEROSIS LOWER EXTREMITIES I70.209;  Surgeon:                Recoveryonly Surgery;  [place] [facility] [place]  [place]:   [place]: UMBILICAL HERNIA REPAIR  No date: CATARACT EXTRACTION WITH IOL; Bilateral  Smoking status: Former Smoker                                                              Packs/day: 1.00      Years: 40.00        Types: Cigarettes     Quit date: 1/1/1989  Smokeless tobacco: Never Used                      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    Review of patient's family history indicates:  Problem: Diabetes      Relation: Father       Age of Onset: (Not Specified)   Problem: Heart Disease      Relation: Mother       Age of Onset: (Not Specified)   Problem: Heart Disease      Relation: Brother       Age of Onset: (Not Specified)       Comment: arrhythmia      Current Outpatient Prescriptions: •  terazosin (HYTRIN) 1 MG Cap, Take 1 Cap by mouth every bedtime., Disp: 30 Cap, Rfl: 3•  QUEtiapine (SEROQUEL) 25 MG Tab, Take 25 mg by mouth every 12 hours., Disp: , Rfl: •  pentoxifylline CR (TRENTAL) 400 MG CR tablet, TAKE ONE TABLET BY MOUTH THREE TIMES A DAY WITH FOOD, Disp: 270 Tab, Rfl: 0•  metoprolol (LOPRESSOR) 50 MG Tab, TAKE ONE TABLET BY MOUTH TWICE A  DAY, Disp: 180 Tab, Rfl: 0•  lisinopril-hydrochlorothiazide (PRINZIDE, ZESTORETIC) 20-25 MG per tablet, TAKE ONE TABLET BY MOUTH DAILY, Disp: 90 Tab, Rfl: 1•  fenofibrate (TRIGLIDE) 160 MG tablet, TAKE ONE TABLET BY MOUTH DAILY, Disp: 90 Tab, Rfl: 1•  gabapentin (NEURONTIN) 100 MG Cap, TAKE ONE CAPSULE BY MOUTH THREE TIMES A DAY AS NEEDED FOR PAIN (Patient not taking: Reported on 11/1/2018), Disp: 90 Cap, Rfl: 0•  cholecalciferol (CVS VIT D 5000 HIGH-POTENCY) 5000 UNIT Cap, Take 1 Cap by mouth every day., Disp: 30 Cap, Rfl: 6•  meloxicam (MOBIC) 7.5 MG Tab, Take 1 Tab by mouth every day. (Patient not taking: Reported on 11/1/2018), Disp: 30 Tab, Rfl: 3•  trazodone (DESYREL) 50 MG Tab, TAKE ONE TO TWO TABLETS BY MOUTH EVERY NIGHT AT BEDTIME AS NEEDED FOR SLEEP (Patient not taking: Reported on 11/1/2018), Disp: 180 Tab, Rfl: 0•  aspirin (ASA) 81 MG Chew Tab chewable tablet, Take 81 mg by mouth every day., Disp: , Rfl: •  timolol (TIMOPTIC) 0.5 % Solution, Place 1 Drop in both eyes every day., Disp: , Rfl: •  dorzolamide (TRUSOPT) 2 % Solution, Place 1 Drop in both eyes 2 Times a Day., Disp: , Rfl:     Patient was instructed on the use of medications, either prescriptions or OTC and informed on when the appropriate follow up time period should be. In addition, patient was also instructed that should any acute worsening occur that they should notify this clinic asap or call 911.            Review of Systems   Constitutional: Negative.  Negative for chills and fever.   HENT: Negative.  Negative for hearing loss.    Eyes: Negative.  Negative for blurred vision and double vision.   Respiratory: Negative.  Negative for cough and hemoptysis.    Cardiovascular: Negative.  Negative for chest pain and palpitations.   Gastrointestinal: Negative.  Negative for heartburn and nausea.   Genitourinary: Positive for frequency. Negative for dysuria.   Musculoskeletal: Positive for back pain. Negative for myalgias.   Skin: Negative.   "Negative for rash.   Neurological: Negative.  Negative for dizziness, tingling and headaches.   Endo/Heme/Allergies: Negative.  Does not bruise/bleed easily.   Psychiatric/Behavioral: Negative.  Negative for depression and suicidal ideas.   All other systems reviewed and are negative.         Objective:     /60 (BP Location: Left arm, Patient Position: Sitting)   Pulse (!) 52   Temp 36.1 °C (96.9 °F)   Resp 12   Ht 1.74 m (5' 8.5\")   Wt 80.3 kg (177 lb)   SpO2 96%   BMI 26.52 kg/m²      Physical Exam   Constitutional: He is oriented to person, place, and time. He appears well-developed and well-nourished. No distress.   HENT:   Head: Normocephalic and atraumatic.   Mouth/Throat: Oropharynx is clear and moist. No oropharyngeal exudate.   Eyes: Pupils are equal, round, and reactive to light.   Cardiovascular: Normal rate, regular rhythm, normal heart sounds and intact distal pulses.  Exam reveals no gallop and no friction rub.    No murmur heard.  Pulmonary/Chest: Effort normal and breath sounds normal. No respiratory distress. He has no wheezes. He has no rales. He exhibits no tenderness.   Musculoskeletal:   Ambulates with walker  Pain pump in llq  No pain on palpation of spine   Neurological: He is alert and oriented to person, place, and time.   Skin: He is not diaphoretic.   Psychiatric: He has a normal mood and affect. His behavior is normal. Judgment and thought content normal.   Nursing note and vitals reviewed.              Assessment/Plan:     1. Plasmacytoma of bone (HCC)      2. PAD (peripheral artery disease) (HCC)      3. Essential hypertension    - terazosin (HYTRIN) 1 MG Cap; Take 1 Cap by mouth every bedtime.  Dispense: 30 Cap; Refill: 3    4. Bilateral hip pain      5. Benign prostatic hyperplasia with lower urinary tract symptoms, symptom details unspecified    - terazosin (HYTRIN) 1 MG Cap; Take 1 Cap by mouth every bedtime.  Dispense: 30 Cap; Refill: 3    6. Opioid type dependence, " continuous (HCC)

## 2019-01-28 DIAGNOSIS — I10 ESSENTIAL HYPERTENSION: ICD-10-CM

## 2019-01-28 DIAGNOSIS — E78.2 MIXED HYPERLIPIDEMIA: ICD-10-CM

## 2019-01-28 DIAGNOSIS — I73.9 PAD (PERIPHERAL ARTERY DISEASE) (HCC): ICD-10-CM

## 2019-01-28 RX ORDER — METOPROLOL TARTRATE 50 MG/1
TABLET, FILM COATED ORAL
Qty: 180 TAB | Refills: 0 | Status: SHIPPED | OUTPATIENT
Start: 2019-01-28 | End: 2019-04-21 | Stop reason: SDUPTHER

## 2019-02-25 ENCOUNTER — OFFICE VISIT (OUTPATIENT)
Dept: MEDICAL GROUP | Facility: PHYSICIAN GROUP | Age: 74
End: 2019-02-25
Payer: MEDICARE

## 2019-02-25 VITALS
HEART RATE: 63 BPM | WEIGHT: 186 LBS | TEMPERATURE: 98.9 F | BODY MASS INDEX: 27.55 KG/M2 | HEIGHT: 69 IN | SYSTOLIC BLOOD PRESSURE: 130 MMHG | OXYGEN SATURATION: 93 % | DIASTOLIC BLOOD PRESSURE: 80 MMHG | RESPIRATION RATE: 12 BRPM

## 2019-02-25 DIAGNOSIS — I73.9 PAD (PERIPHERAL ARTERY DISEASE) (HCC): ICD-10-CM

## 2019-02-25 DIAGNOSIS — I10 ESSENTIAL HYPERTENSION: ICD-10-CM

## 2019-02-25 DIAGNOSIS — C90.30 PLASMACYTOMA OF BONE (HCC): ICD-10-CM

## 2019-02-25 DIAGNOSIS — R60.0 PEDAL EDEMA: ICD-10-CM

## 2019-02-25 DIAGNOSIS — E78.2 MIXED HYPERLIPIDEMIA: ICD-10-CM

## 2019-02-25 DIAGNOSIS — F11.20 OPIOID TYPE DEPENDENCE, CONTINUOUS (HCC): ICD-10-CM

## 2019-02-25 PROCEDURE — 8041 PR SCP AHA: Performed by: FAMILY MEDICINE

## 2019-02-25 PROCEDURE — 99214 OFFICE O/P EST MOD 30 MIN: CPT | Mod: 25 | Performed by: FAMILY MEDICINE

## 2019-02-25 RX ORDER — FUROSEMIDE 40 MG/1
40 TABLET ORAL DAILY
Qty: 30 TAB | Refills: 3 | Status: SHIPPED | OUTPATIENT
Start: 2019-02-25 | End: 2019-08-13 | Stop reason: SDUPTHER

## 2019-02-25 ASSESSMENT — ENCOUNTER SYMPTOMS
GASTROINTESTINAL NEGATIVE: 1
HEMOPTYSIS: 0
RESPIRATORY NEGATIVE: 1
DEPRESSION: 0
PSYCHIATRIC NEGATIVE: 1
BLURRED VISION: 0
NAUSEA: 0
BACK PAIN: 1
HEADACHES: 0
DOUBLE VISION: 0
HEARTBURN: 0
FEVER: 0
COUGH: 0
MYALGIAS: 0
EYES NEGATIVE: 1
TINGLING: 0
DIZZINESS: 0
CHILLS: 0
NEUROLOGICAL NEGATIVE: 1
CONSTITUTIONAL NEGATIVE: 1
PALPITATIONS: 0
BRUISES/BLEEDS EASILY: 0

## 2019-02-25 NOTE — PROGRESS NOTES
Annual Health Assessment Questions:    1.  Are you currently engaging in any exercise or physical activity? Yes    2.  How would you describe your mood or emotional well-being today? fair    3.  Have you had any falls in the last year? Yes    4.  Have you noticed any problems with your balance or had difficulty walking? Yes    5.  In the last six months have you experienced any leakage of urine? Yes    6. DPA/Advanced Directive: Patient does not have an Advanced Directive.  A packet and workshop information was given on Advanced Directives.

## 2019-02-26 NOTE — PROGRESS NOTES
Subjective:      Shun Alvarez is a 73 y.o. male who presents with Hyperlipidemia            Annual Health Assessment Questions:    1.  Are you currently engaging in any exercise or physical activity? Yes    2.  How would you describe your mood or emotional well-being today? fair    3.  Have you had any falls in the last year? Yes    4.  Have you noticed any problems with your balance or had difficulty walking? Yes    5.  In the last six months have you experienced any leakage of urine? Yes    6. DPA/Advanced Directive: Patient does not have an Advanced Directive.  A packet and workshop information was given on Advanced Directives.      1. Essential hypertension  Currently treated for HTN, taking meds with no CP or sob, monitors bp at home periodically. Controlled  Much better with addition of hytrin  - furosemide (LASIX) 40 MG Tab; Take 1 Tab by mouth every day.  Dispense: 30 Tab; Refill: 3  - Comp Metabolic Panel; Future  - CBC WITH DIFFERENTIAL; Future    2. PAD (peripheral artery disease) (HCC)  The patient's current medical issue is well controlled on the current therapy with no new symptoms or worsening    - furosemide (LASIX) 40 MG Tab; Take 1 Tab by mouth every day.  Dispense: 30 Tab; Refill: 3  - Comp Metabolic Panel; Future  - CBC WITH DIFFERENTIAL; Future    3. Opioid type dependence, continuous (HCC)  On pain pump  - furosemide (LASIX) 40 MG Tab; Take 1 Tab by mouth every day.  Dispense: 30 Tab; Refill: 3  - Comp Metabolic Panel; Future  - CBC WITH DIFFERENTIAL; Future    4. Plasmacytoma of bone (HCC)  Done with radiation, now just on pain control from pain clinic   - furosemide (LASIX) 40 MG Tab; Take 1 Tab by mouth every day.  Dispense: 30 Tab; Refill: 3  - Comp Metabolic Panel; Future  - CBC WITH DIFFERENTIAL; Future    5. Mixed hyperlipidemia  Currently treated for HLD, taking meds with no new myalgias or joint pain, watching fats in diet  controlled    - furosemide (LASIX) 40 MG Tab; Take 1  Tab by mouth every day.  Dispense: 30 Tab; Refill: 3  - Comp Metabolic Panel; Future  - CBC WITH DIFFERENTIAL; Future    6. Pedal edema  Some worsening of chronic venous statis lately  Will treat with lasix and get labs and f/u in 4 weeks  - furosemide (LASIX) 40 MG Tab; Take 1 Tab by mouth every day.  Dispense: 30 Tab; Refill: 3  - Comp Metabolic Panel; Future  - CBC WITH DIFFERENTIAL; Future    Past Medical History:  No date: Cataract      Comment:  removed bilat  No date: Hyperlipidemia  No date: Hypertension  No date: PAD (peripheral artery disease)  08-: Pain      Comment:  left leg w/walking, 0/10  No date: Retinopathy of left eye      Comment:  due to previous surgery  No date: Snoring  Past Surgical History:  8/10/2016: FEMORAL ENDARTERECTOMY; Left      Comment:  Procedure: FEMORAL ENDARTERECTOMY COMMON;  Surgeon: Miky Whitney M.D.;  Location: SURGERY [facility];                 Service:   6/15/2016: RECOVERY      Comment:  Procedure: VASCULAR CASE-MELVIN-ABDOMINAL AORTOGRAM WITH               RUNOFF, POSSIBLE INTERVENTION 73877, 37824, 40218,                ATHEROSCLEROSIS LOWER EXTREMITIES I70.209;  Surgeon:                Recoveryonly Surgery;  Location: SURGERY PRE-POST PROC                [place] [facility] [place];  Service:   2000: UMBILICAL HERNIA REPAIR  No date: CATARACT EXTRACTION WITH IOL; Bilateral  Smoking status: Former Smoker                                                              Packs/day: 1.00      Years: 40.00        Types: Cigarettes     Quit date: 1/1/1989  Smokeless tobacco: Never Used                      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    Review of patient's family history indicates:  Problem: Diabetes      Relation: Father       Age of Onset: (Not Specified)   Problem: Heart Disease      Relation: Mother       Age of Onset: (Not Specified)   Problem: Heart Disease      Relation: Brother       Age of Onset: (Not Specified)       Comment:  arrhythmia      Current Outpatient Prescriptions: •  furosemide (LASIX) 40 MG Tab, Take 1 Tab by mouth every day., Disp: 30 Tab, Rfl: 3•  metoprolol (LOPRESSOR) 50 MG Tab, TAKE ONE TABLET BY MOUTH TWICE A DAY, Disp: 180 Tab, Rfl: 0•  QUEtiapine (SEROQUEL) 25 MG Tab, Take 25 mg by mouth every 12 hours., Disp: , Rfl: •  terazosin (HYTRIN) 1 MG Cap, Take 1 Cap by mouth every bedtime., Disp: 30 Cap, Rfl: 3•  pentoxifylline CR (TRENTAL) 400 MG CR tablet, TAKE ONE TABLET BY MOUTH THREE TIMES A DAY WITH FOOD, Disp: 270 Tab, Rfl: 0•  lisinopril-hydrochlorothiazide (PRINZIDE, ZESTORETIC) 20-25 MG per tablet, TAKE ONE TABLET BY MOUTH DAILY, Disp: 90 Tab, Rfl: 1•  fenofibrate (TRIGLIDE) 160 MG tablet, TAKE ONE TABLET BY MOUTH DAILY, Disp: 90 Tab, Rfl: 1•  gabapentin (NEURONTIN) 100 MG Cap, TAKE ONE CAPSULE BY MOUTH THREE TIMES A DAY AS NEEDED FOR PAIN (Patient not taking: Reported on 11/1/2018), Disp: 90 Cap, Rfl: 0•  cholecalciferol (CVS VIT D 5000 HIGH-POTENCY) 5000 UNIT Cap, Take 1 Cap by mouth every day., Disp: 30 Cap, Rfl: 6•  meloxicam (MOBIC) 7.5 MG Tab, Take 1 Tab by mouth every day. (Patient not taking: Reported on 11/1/2018), Disp: 30 Tab, Rfl: 3•  trazodone (DESYREL) 50 MG Tab, TAKE ONE TO TWO TABLETS BY MOUTH EVERY NIGHT AT BEDTIME AS NEEDED FOR SLEEP (Patient not taking: Reported on 11/1/2018), Disp: 180 Tab, Rfl: 0•  aspirin (ASA) 81 MG Chew Tab chewable tablet, Take 81 mg by mouth every day., Disp: , Rfl: •  timolol (TIMOPTIC) 0.5 % Solution, Place 1 Drop in both eyes every day., Disp: , Rfl: •  dorzolamide (TRUSOPT) 2 % Solution, Place 1 Drop in both eyes 2 Times a Day., Disp: , Rfl:     Patient was instructed on the use of medications, either prescriptions or OTC and informed on when the appropriate follow up time period should be. In addition, patient was also instructed that should any acute worsening occur that they should notify this clinic asap or call 911.            Review of Systems   Constitutional:  "Negative.  Negative for chills and fever.   HENT: Negative.  Negative for hearing loss.    Eyes: Negative.  Negative for blurred vision and double vision.   Respiratory: Negative.  Negative for cough and hemoptysis.    Cardiovascular: Positive for leg swelling. Negative for chest pain and palpitations.   Gastrointestinal: Negative.  Negative for heartburn and nausea.   Genitourinary: Negative.  Negative for dysuria.   Musculoskeletal: Positive for back pain. Negative for myalgias.   Skin: Negative.  Negative for rash.   Neurological: Negative.  Negative for dizziness, tingling and headaches.   Endo/Heme/Allergies: Negative.  Does not bruise/bleed easily.   Psychiatric/Behavioral: Negative.  Negative for depression and suicidal ideas.   All other systems reviewed and are negative.         Objective:     /80 (BP Location: Left arm, Patient Position: Sitting)   Pulse 63   Temp 37.2 °C (98.9 °F)   Resp 12   Ht 1.74 m (5' 8.5\")   Wt 84.4 kg (186 lb)   SpO2 93%   BMI 27.87 kg/m²      Physical Exam   Constitutional: He is oriented to person, place, and time. He appears well-developed and well-nourished. No distress.   HENT:   Head: Normocephalic and atraumatic.   Mouth/Throat: Oropharynx is clear and moist. No oropharyngeal exudate.   Eyes: Pupils are equal, round, and reactive to light.   Cardiovascular: Normal rate, regular rhythm, normal heart sounds and intact distal pulses.  Exam reveals no gallop and no friction rub.    No murmur heard.  Pulmonary/Chest: Effort normal and breath sounds normal. No respiratory distress. He has no wheezes. He has no rales. He exhibits no tenderness.   Neurological: He is alert and oriented to person, place, and time.   Skin: He is not diaphoretic.   Chronic venous stasis and mild edema in both feet   Psychiatric: He has a normal mood and affect. His behavior is normal. Judgment and thought content normal.   Nursing note and vitals reviewed.              Assessment/Plan: "     1. Essential hypertension    - furosemide (LASIX) 40 MG Tab; Take 1 Tab by mouth every day.  Dispense: 30 Tab; Refill: 3  - Comp Metabolic Panel; Future  - CBC WITH DIFFERENTIAL; Future    2. PAD (peripheral artery disease) (HCC)    - furosemide (LASIX) 40 MG Tab; Take 1 Tab by mouth every day.  Dispense: 30 Tab; Refill: 3  - Comp Metabolic Panel; Future  - CBC WITH DIFFERENTIAL; Future    3. Opioid type dependence, continuous (HCC)    - furosemide (LASIX) 40 MG Tab; Take 1 Tab by mouth every day.  Dispense: 30 Tab; Refill: 3  - Comp Metabolic Panel; Future  - CBC WITH DIFFERENTIAL; Future    4. Plasmacytoma of bone (HCC)    - furosemide (LASIX) 40 MG Tab; Take 1 Tab by mouth every day.  Dispense: 30 Tab; Refill: 3  - Comp Metabolic Panel; Future  - CBC WITH DIFFERENTIAL; Future    5. Mixed hyperlipidemia    - furosemide (LASIX) 40 MG Tab; Take 1 Tab by mouth every day.  Dispense: 30 Tab; Refill: 3  - Comp Metabolic Panel; Future  - CBC WITH DIFFERENTIAL; Future    6. Pedal edema    - furosemide (LASIX) 40 MG Tab; Take 1 Tab by mouth every day.  Dispense: 30 Tab; Refill: 3  - Comp Metabolic Panel; Future  - CBC WITH DIFFERENTIAL; Future

## 2019-03-14 DIAGNOSIS — M25.552 BILATERAL HIP PAIN: ICD-10-CM

## 2019-03-14 DIAGNOSIS — M25.551 BILATERAL HIP PAIN: ICD-10-CM

## 2019-03-14 RX ORDER — FENOFIBRATE 160 MG/1
TABLET ORAL
Qty: 90 TAB | Refills: 0 | Status: SHIPPED | OUTPATIENT
Start: 2019-03-14 | End: 2019-06-23 | Stop reason: SDUPTHER

## 2019-04-02 ENCOUNTER — OFFICE VISIT (OUTPATIENT)
Dept: MEDICAL GROUP | Facility: PHYSICIAN GROUP | Age: 74
End: 2019-04-02
Payer: MEDICARE

## 2019-04-02 ENCOUNTER — HOSPITAL ENCOUNTER (OUTPATIENT)
Dept: LAB | Facility: MEDICAL CENTER | Age: 74
End: 2019-04-02
Attending: FAMILY MEDICINE
Payer: MEDICARE

## 2019-04-02 ENCOUNTER — TELEPHONE (OUTPATIENT)
Dept: MEDICAL GROUP | Facility: PHYSICIAN GROUP | Age: 74
End: 2019-04-02

## 2019-04-02 VITALS
RESPIRATION RATE: 14 BRPM | SYSTOLIC BLOOD PRESSURE: 130 MMHG | TEMPERATURE: 96.6 F | OXYGEN SATURATION: 95 % | DIASTOLIC BLOOD PRESSURE: 92 MMHG | BODY MASS INDEX: 25.92 KG/M2 | HEIGHT: 69 IN | WEIGHT: 175 LBS | HEART RATE: 60 BPM

## 2019-04-02 DIAGNOSIS — R33.9 URINARY RETENTION: ICD-10-CM

## 2019-04-02 DIAGNOSIS — M79.89 PAIN AND SWELLING OF RIGHT LOWER EXTREMITY: ICD-10-CM

## 2019-04-02 DIAGNOSIS — I73.9 PAD (PERIPHERAL ARTERY DISEASE) (HCC): ICD-10-CM

## 2019-04-02 DIAGNOSIS — R60.0 PEDAL EDEMA: ICD-10-CM

## 2019-04-02 DIAGNOSIS — M79.604 PAIN AND SWELLING OF RIGHT LOWER EXTREMITY: ICD-10-CM

## 2019-04-02 DIAGNOSIS — E78.2 MIXED HYPERLIPIDEMIA: ICD-10-CM

## 2019-04-02 DIAGNOSIS — I10 ESSENTIAL HYPERTENSION: ICD-10-CM

## 2019-04-02 DIAGNOSIS — R41.0 DISORIENTATION: ICD-10-CM

## 2019-04-02 DIAGNOSIS — C90.30 PLASMACYTOMA OF BONE (HCC): ICD-10-CM

## 2019-04-02 DIAGNOSIS — F11.20 OPIOID TYPE DEPENDENCE, CONTINUOUS (HCC): ICD-10-CM

## 2019-04-02 LAB
ALBUMIN SERPL BCP-MCNC: 4 G/DL (ref 3.2–4.9)
ALBUMIN/GLOB SERPL: 1.3 G/DL
ALP SERPL-CCNC: 50 U/L (ref 30–99)
ALT SERPL-CCNC: 23 U/L (ref 2–50)
ANION GAP SERPL CALC-SCNC: 7 MMOL/L (ref 0–11.9)
AST SERPL-CCNC: 41 U/L (ref 12–45)
BASOPHILS # BLD AUTO: 0.7 % (ref 0–1.8)
BASOPHILS # BLD: 0.04 K/UL (ref 0–0.12)
BILIRUB SERPL-MCNC: 0.5 MG/DL (ref 0.1–1.5)
BUN SERPL-MCNC: 97 MG/DL (ref 8–22)
CALCIUM SERPL-MCNC: 13 MG/DL (ref 8.5–10.5)
CHLORIDE SERPL-SCNC: 108 MMOL/L (ref 96–112)
CO2 SERPL-SCNC: 29 MMOL/L (ref 20–33)
CREAT SERPL-MCNC: 3.72 MG/DL (ref 0.5–1.4)
EOSINOPHIL # BLD AUTO: 0.35 K/UL (ref 0–0.51)
EOSINOPHIL NFR BLD: 6.1 % (ref 0–6.9)
ERYTHROCYTE [DISTWIDTH] IN BLOOD BY AUTOMATED COUNT: 48.8 FL (ref 35.9–50)
GLOBULIN SER CALC-MCNC: 3.2 G/DL (ref 1.9–3.5)
GLUCOSE SERPL-MCNC: 117 MG/DL (ref 65–99)
HCT VFR BLD AUTO: 40.3 % (ref 42–52)
HGB BLD-MCNC: 12.9 G/DL (ref 14–18)
IMM GRANULOCYTES # BLD AUTO: 0.01 K/UL (ref 0–0.11)
IMM GRANULOCYTES NFR BLD AUTO: 0.2 % (ref 0–0.9)
LYMPHOCYTES # BLD AUTO: 0.74 K/UL (ref 1–4.8)
LYMPHOCYTES NFR BLD: 12.9 % (ref 22–41)
MCH RBC QN AUTO: 30.1 PG (ref 27–33)
MCHC RBC AUTO-ENTMCNC: 32 G/DL (ref 33.7–35.3)
MCV RBC AUTO: 94.2 FL (ref 81.4–97.8)
MONOCYTES # BLD AUTO: 0.47 K/UL (ref 0–0.85)
MONOCYTES NFR BLD AUTO: 8.2 % (ref 0–13.4)
NEUTROPHILS # BLD AUTO: 4.13 K/UL (ref 1.82–7.42)
NEUTROPHILS NFR BLD: 71.9 % (ref 44–72)
NRBC # BLD AUTO: 0 K/UL
NRBC BLD-RTO: 0 /100 WBC
PLATELET # BLD AUTO: 107 K/UL (ref 164–446)
PMV BLD AUTO: 12.2 FL (ref 9–12.9)
POTASSIUM SERPL-SCNC: 3.9 MMOL/L (ref 3.6–5.5)
PROT SERPL-MCNC: 7.2 G/DL (ref 6–8.2)
RBC # BLD AUTO: 4.28 M/UL (ref 4.7–6.1)
SODIUM SERPL-SCNC: 144 MMOL/L (ref 135–145)
WBC # BLD AUTO: 5.7 K/UL (ref 4.8–10.8)

## 2019-04-02 PROCEDURE — 99214 OFFICE O/P EST MOD 30 MIN: CPT | Performed by: INTERNAL MEDICINE

## 2019-04-02 PROCEDURE — 36415 COLL VENOUS BLD VENIPUNCTURE: CPT

## 2019-04-02 PROCEDURE — 85025 COMPLETE CBC W/AUTO DIFF WBC: CPT

## 2019-04-02 PROCEDURE — 80053 COMPREHEN METABOLIC PANEL: CPT

## 2019-04-02 NOTE — PROGRESS NOTES
Established Patient    Shun Alvarez is a 74 y.o. male who presents today with the following:    CC:   Chief Complaint   Patient presents with   • Urinary Retention   • Altered Mental Status       HPI:   73 yo M with hx of plasmacytoma s/p radiation, pain management with pain pump, CKD stage 3, HTN, recent stroke, PAD  presents with disorientation, urinary retention, generalized weakness for 3-4 days.  His wife noticed that he has difficulty urinating for 3-4 days. His wife noticed that he is not himself and more disoriented.  He has dry mouth.  He has RLE edema.   His vision at baseline. Left eye blindness due to macular degeneration.   He has constipation on stool softener  He denies fever, chills, nausea, vomiting, heartburn, chest pain, SOB, cough, abdominal pain, diarrhea      Current Outpatient Prescriptions   Medication Sig Dispense Refill   • fenofibrate (TRIGLIDE) 160 MG tablet TAKE ONE TABLET BY MOUTH DAILY 90 Tab 0   • furosemide (LASIX) 40 MG Tab Take 1 Tab by mouth every day. 30 Tab 3   • metoprolol (LOPRESSOR) 50 MG Tab TAKE ONE TABLET BY MOUTH TWICE A  Tab 0   • QUEtiapine (SEROQUEL) 25 MG Tab Take 25 mg by mouth every 12 hours.     • terazosin (HYTRIN) 1 MG Cap Take 1 Cap by mouth every bedtime. 30 Cap 3   • pentoxifylline CR (TRENTAL) 400 MG CR tablet TAKE ONE TABLET BY MOUTH THREE TIMES A DAY WITH FOOD 270 Tab 0   • lisinopril-hydrochlorothiazide (PRINZIDE, ZESTORETIC) 20-25 MG per tablet TAKE ONE TABLET BY MOUTH DAILY 90 Tab 1   • gabapentin (NEURONTIN) 100 MG Cap TAKE ONE CAPSULE BY MOUTH THREE TIMES A DAY AS NEEDED FOR PAIN 90 Cap 0   • cholecalciferol (CVS VIT D 5000 HIGH-POTENCY) 5000 UNIT Cap Take 1 Cap by mouth every day. 30 Cap 6   • aspirin (ASA) 81 MG Chew Tab chewable tablet Take 81 mg by mouth every day.     • timolol (TIMOPTIC) 0.5 % Solution Place 1 Drop in both eyes every day.     • dorzolamide (TRUSOPT) 2 % Solution Place 1 Drop in both eyes 2 Times a Day.       No  "current facility-administered medications for this visit.        Allergies, past medical history, past surgical history, medications, family history, social history reviewed and updated.    ROS   Constitutional: Denies fevers or chills  Eyes: Denies changes in vision  Ears/Nose/Throat/Mouth: Denies nasal congestion or sore throat   Cardiovascular: Denies chest pain or palpitations   Respiratory: Denies shortness of breath , Denies cough  Gastrointestinal/Hepatic: Denies abd pain, nausea, vomiting   Genitourinary: Denies dysuria or frequency  Musculoskeletal/Rheum: Denies joint pain and swelling   Neurological: disorientation. Denies headache  Psychiatric: Denies mood disorder   Endocrine: Denies hx of diabetes or thyroid dysfunction  Heme/Oncology/Lymph Nodes: Denies weight changes or enlarged LNs.    Physical Exam  Vitals: /92 (BP Location: Left arm, Patient Position: Sitting, BP Cuff Size: Adult)   Pulse 60   Temp 35.9 °C (96.6 °F) (Temporal)   Resp 14   Ht 1.74 m (5' 8.5\")   Wt 79.4 kg (175 lb)   SpO2 95%   BMI 26.22 kg/m²   General: Alert, pleasant, NAD  HEENT: Normocephalic. Left eye white discoloration.   EOMI, no icterus or pallor.  Conjunctivae and lids normal. External ears normal. Oropharynx non-erythematous, mucous membranes moist.  Neck supple.  No thyromegaly or masses palpated.   Dry mouth.   Lymph: No cervical or supraclavicular lymphadenopathy.  Cardiovascular: Regular rate and rhythm.   Respiratory: Normal respiratory effort.  Clear to auscultation bilaterally.  Abdomen: Non-distended, soft, nontender  Skin: Warm, dry, no rashes.  Musculoskeletal: Gait is slow with walker.    Extremities: RLE edema  Psych:  Affect/mood is normal, grooming is appropriate.  Neuro: left eye blindness. Right eye vision as baseline, hearing slightly decreased, motor 4-5/5, walking with walker  AAO x his name, person, situation, not to time and place.     Assessment and Plan    Shun was seen today for urinary " retention and altered mental status.    Diagnoses and all orders for this visit:    Disorientation  Urinary retention  Swelling of right lower extremity  Dehydration    Recommend patient to go to ER for emergent work up for disorientation, acute urinary retention, dehydration and RLE swelling (need to rule out DVT).   Called and discussed with ER physician at [facility].     Follow-up: as needed after being discharge from the hospital/ ER    This note was created using voice recognition software. There may be unintended errors in spelling, grammar or content.

## 2019-04-02 NOTE — TELEPHONE ENCOUNTER
I left a message letting the patient know he needs to evaluated for urinary retention, preferably at the ER. I explained in the message that they have the equipment to properly check for this.

## 2019-04-02 NOTE — TELEPHONE ENCOUNTER
----- Message from Shun Alvarez sent at 4/1/2019  7:18 PM PDT -----  Regarding: Prescription Question  Contact: [phone]  I am bringing Shun in tomorrow for blood work.  He has been having problems being able urinate for the past three or four days.  Should I be concerned?  The last prescription you wrote was for high blood pressure and to reduce the urination frequency at night.  This was 6 weeks ago.  We have an appointment on Monday the 8th.  He is only going pee once or twice a day.  What should I watch for?

## 2019-04-03 NOTE — TELEPHONE ENCOUNTER
I was notified of a critical lab result, a Calcium level of 13.  I confirmed with the patient's wife that he had gone to the ER after being seen by Dr. Heredia this afternoon due to the critical lab value.  She confirmed that he was admitted at [facility] and they were aware of the hypercalcemia.

## 2019-04-21 DIAGNOSIS — E78.2 MIXED HYPERLIPIDEMIA: ICD-10-CM

## 2019-04-21 DIAGNOSIS — I10 ESSENTIAL HYPERTENSION: ICD-10-CM

## 2019-04-21 DIAGNOSIS — I73.9 PAD (PERIPHERAL ARTERY DISEASE) (HCC): ICD-10-CM

## 2019-04-22 RX ORDER — METOPROLOL TARTRATE 50 MG/1
TABLET, FILM COATED ORAL
Qty: 180 TAB | Refills: 0 | Status: SHIPPED | OUTPATIENT
Start: 2019-04-22 | End: 2019-08-09 | Stop reason: SDUPTHER

## 2019-05-14 ENCOUNTER — PATIENT MESSAGE (OUTPATIENT)
Dept: MEDICAL GROUP | Facility: PHYSICIAN GROUP | Age: 74
End: 2019-05-14

## 2019-05-14 RX ORDER — SERTRALINE HYDROCHLORIDE 25 MG/1
25 TABLET, FILM COATED ORAL DAILY
COMMUNITY
End: 2019-05-14 | Stop reason: SDUPTHER

## 2019-05-14 RX ORDER — SERTRALINE HYDROCHLORIDE 25 MG/1
25 TABLET, FILM COATED ORAL DAILY
Qty: 90 TAB | Refills: 0 | Status: SHIPPED | OUTPATIENT
Start: 2019-05-14 | End: 2019-07-29 | Stop reason: SDUPTHER

## 2019-05-14 NOTE — PATIENT COMMUNICATION
Received fax from [place] for this medication.     Was the patient seen in the last year in this department? Yes    Does patient have an active prescription for medications requested? Yes    Received Request Via: Pharmacy    Last visit 4/2/2019  Last labs 4/2/2019

## 2019-05-27 DIAGNOSIS — I10 ESSENTIAL HYPERTENSION: ICD-10-CM

## 2019-05-27 DIAGNOSIS — N40.1 BENIGN PROSTATIC HYPERPLASIA WITH LOWER URINARY TRACT SYMPTOMS, SYMPTOM DETAILS UNSPECIFIED: ICD-10-CM

## 2019-05-28 RX ORDER — TERAZOSIN 1 MG/1
CAPSULE ORAL
Qty: 30 CAP | Refills: 2 | Status: SHIPPED | OUTPATIENT
Start: 2019-05-28 | End: 2019-09-13 | Stop reason: SDUPTHER

## 2019-05-28 RX ORDER — PENTOXIFYLLINE 400 MG/1
TABLET, EXTENDED RELEASE ORAL
Qty: 270 TAB | Refills: 0 | Status: SHIPPED | OUTPATIENT
Start: 2019-05-28 | End: 2019-10-11 | Stop reason: SDUPTHER

## 2019-05-28 NOTE — TELEPHONE ENCOUNTER
Was the patient seen in the last year in this department? Yes    Does patient have an active prescription for medications requested? Yes    Received Request Via: Pharmacy     Last visit 4/2/2019  Last labs 4/2/2019

## 2019-06-10 NOTE — TELEPHONE ENCOUNTER
Was the patient seen in the last year in this department? Yes    Does patient have an active prescription for medications requested? Yes    Received Request Via: Pharmacy     Last Visit:04/02/2019  Last Lab:04/02/2019

## 2019-06-11 RX ORDER — AMLODIPINE BESYLATE 10 MG/1
TABLET ORAL
Qty: 90 TAB | Refills: 0 | Status: SHIPPED | OUTPATIENT
Start: 2019-06-11 | End: 2019-09-27 | Stop reason: SDUPTHER

## 2019-06-12 DIAGNOSIS — E78.5 HYPERLIPIDEMIA, UNSPECIFIED HYPERLIPIDEMIA TYPE: ICD-10-CM

## 2019-06-13 RX ORDER — LISINOPRIL AND HYDROCHLOROTHIAZIDE 25; 20 MG/1; MG/1
TABLET ORAL
Qty: 100 TAB | Refills: 1 | Status: SHIPPED | OUTPATIENT
Start: 2019-06-13 | End: 2020-01-02

## 2019-06-23 DIAGNOSIS — M25.551 BILATERAL HIP PAIN: ICD-10-CM

## 2019-06-23 DIAGNOSIS — M25.552 BILATERAL HIP PAIN: ICD-10-CM

## 2019-06-24 RX ORDER — FENOFIBRATE 160 MG/1
TABLET ORAL
Qty: 100 TAB | Refills: 0 | Status: SHIPPED | OUTPATIENT
Start: 2019-06-24 | End: 2019-09-27 | Stop reason: SDUPTHER

## 2019-07-02 ENCOUNTER — HOSPITAL ENCOUNTER (OUTPATIENT)
Dept: LAB | Facility: MEDICAL CENTER | Age: 74
End: 2019-07-02
Attending: INTERNAL MEDICINE
Payer: MEDICARE

## 2019-07-02 LAB
ALBUMIN SERPL BCP-MCNC: 3.7 G/DL (ref 3.2–4.9)
ALBUMIN/GLOB SERPL: 1.1 G/DL
ALP SERPL-CCNC: 66 U/L (ref 30–99)
ALT SERPL-CCNC: 21 U/L (ref 2–50)
ANION GAP SERPL CALC-SCNC: 8 MMOL/L (ref 0–11.9)
AST SERPL-CCNC: 33 U/L (ref 12–45)
BASOPHILS # BLD AUTO: 0.9 % (ref 0–1.8)
BASOPHILS # BLD: 0.04 K/UL (ref 0–0.12)
BILIRUB SERPL-MCNC: 0.4 MG/DL (ref 0.1–1.5)
BUN SERPL-MCNC: 54 MG/DL (ref 8–22)
CALCIUM SERPL-MCNC: 9.6 MG/DL (ref 8.5–10.5)
CHLORIDE SERPL-SCNC: 116 MMOL/L (ref 96–112)
CO2 SERPL-SCNC: 23 MMOL/L (ref 20–33)
COMMENT 1642: NORMAL
CREAT SERPL-MCNC: 1.6 MG/DL (ref 0.5–1.4)
EOSINOPHIL # BLD AUTO: 0.28 K/UL (ref 0–0.51)
EOSINOPHIL NFR BLD: 6.2 % (ref 0–6.9)
ERYTHROCYTE [DISTWIDTH] IN BLOOD BY AUTOMATED COUNT: 55.9 FL (ref 35.9–50)
GLOBULIN SER CALC-MCNC: 3.3 G/DL (ref 1.9–3.5)
GLUCOSE SERPL-MCNC: 97 MG/DL (ref 65–99)
HCT VFR BLD AUTO: 32.8 % (ref 42–52)
HGB BLD-MCNC: 9.8 G/DL (ref 14–18)
IMM GRANULOCYTES # BLD AUTO: 0.03 K/UL (ref 0–0.11)
IMM GRANULOCYTES NFR BLD AUTO: 0.7 % (ref 0–0.9)
LYMPHOCYTES # BLD AUTO: 0.94 K/UL (ref 1–4.8)
LYMPHOCYTES NFR BLD: 20.7 % (ref 22–41)
MCH RBC QN AUTO: 29.9 PG (ref 27–33)
MCHC RBC AUTO-ENTMCNC: 29.9 G/DL (ref 33.7–35.3)
MCV RBC AUTO: 100 FL (ref 81.4–97.8)
MONOCYTES # BLD AUTO: 0.47 K/UL (ref 0–0.85)
MONOCYTES NFR BLD AUTO: 10.4 % (ref 0–13.4)
MORPHOLOGY BLD-IMP: NORMAL
NEUTROPHILS # BLD AUTO: 2.78 K/UL (ref 1.82–7.42)
NEUTROPHILS NFR BLD: 61.1 % (ref 44–72)
NRBC # BLD AUTO: 0 K/UL
NRBC BLD-RTO: 0 /100 WBC
PLATELET # BLD AUTO: 135 K/UL (ref 164–446)
PLATELET BLD QL SMEAR: NORMAL
PMV BLD AUTO: 12.1 FL (ref 9–12.9)
POTASSIUM SERPL-SCNC: 4.2 MMOL/L (ref 3.6–5.5)
PROT SERPL-MCNC: 7 G/DL (ref 6–8.2)
RBC # BLD AUTO: 3.28 M/UL (ref 4.7–6.1)
RBC BLD AUTO: NORMAL
SODIUM SERPL-SCNC: 147 MMOL/L (ref 135–145)
WBC # BLD AUTO: 4.5 K/UL (ref 4.8–10.8)

## 2019-07-02 PROCEDURE — 36415 COLL VENOUS BLD VENIPUNCTURE: CPT

## 2019-07-02 PROCEDURE — 86334 IMMUNOFIX E-PHORESIS SERUM: CPT

## 2019-07-02 PROCEDURE — 80053 COMPREHEN METABOLIC PANEL: CPT

## 2019-07-02 PROCEDURE — 82784 ASSAY IGA/IGD/IGG/IGM EACH: CPT

## 2019-07-02 PROCEDURE — 84160 ASSAY OF PROTEIN ANY SOURCE: CPT

## 2019-07-02 PROCEDURE — 85025 COMPLETE CBC W/AUTO DIFF WBC: CPT

## 2019-07-02 PROCEDURE — 83883 ASSAY NEPHELOMETRY NOT SPEC: CPT

## 2019-07-02 PROCEDURE — 84165 PROTEIN E-PHORESIS SERUM: CPT

## 2019-07-05 LAB
ALBUMIN SERPL-MCNC: 3.46 G/DL (ref 3.75–5.01)
ALPHA1 GLOB SERPL ELPH-MCNC: 0.34 G/DL (ref 0.19–0.46)
ALPHA2 GLOB SERPL ELPH-MCNC: 0.73 G/DL (ref 0.48–1.05)
B-GLOBULIN SERPL ELPH-MCNC: 0.67 G/DL (ref 0.48–1.1)
GAMMA GLOB SERPL ELPH-MCNC: 1.5 G/DL (ref 0.62–1.51)
IGA SERPL-MCNC: <7 MG/DL (ref 68–408)
IGG SERPL-MCNC: 1680 MG/DL (ref 768–1632)
IGM SERPL-MCNC: 20 MG/DL (ref 35–263)
INTERPRETATION SERPL IFE-IMP: ABNORMAL
INTERPRETATION SERPL IFE-IMP: ABNORMAL
KAPPA LC FREE SER-MCNC: 7.2 MG/DL (ref 0.33–1.94)
KAPPA LC FREE/LAMBDA FREE SER NEPH: 0.9 {RATIO} (ref 0.26–1.65)
LAMBDA LC FREE SERPL-MCNC: 7.96 MG/DL (ref 0.57–2.63)
PATHOLOGY STUDY: ABNORMAL
PROT SERPL-MCNC: 6.7 G/DL (ref 6–8.3)

## 2019-07-11 ENCOUNTER — OFFICE VISIT (OUTPATIENT)
Dept: MEDICAL GROUP | Facility: PHYSICIAN GROUP | Age: 74
End: 2019-07-11
Payer: MEDICARE

## 2019-07-11 ENCOUNTER — APPOINTMENT (OUTPATIENT)
Dept: URGENT CARE | Facility: CLINIC | Age: 74
End: 2019-07-11
Payer: MEDICARE

## 2019-07-11 ENCOUNTER — APPOINTMENT (OUTPATIENT)
Dept: RADIOLOGY | Facility: IMAGING CENTER | Age: 74
End: 2019-07-11
Attending: FAMILY MEDICINE
Payer: MEDICARE

## 2019-07-11 VITALS
RESPIRATION RATE: 12 BRPM | OXYGEN SATURATION: 97 % | TEMPERATURE: 97.4 F | HEART RATE: 60 BPM | DIASTOLIC BLOOD PRESSURE: 70 MMHG | SYSTOLIC BLOOD PRESSURE: 122 MMHG | HEIGHT: 69 IN | WEIGHT: 180 LBS | BODY MASS INDEX: 26.66 KG/M2

## 2019-07-11 DIAGNOSIS — M25.552 BILATERAL HIP PAIN: ICD-10-CM

## 2019-07-11 DIAGNOSIS — M25.551 BILATERAL HIP PAIN: ICD-10-CM

## 2019-07-11 DIAGNOSIS — C90.30 PLASMACYTOMA OF BONE (HCC): ICD-10-CM

## 2019-07-11 DIAGNOSIS — R31.21 ASYMPTOMATIC MICROSCOPIC HEMATURIA: ICD-10-CM

## 2019-07-11 PROCEDURE — 99214 OFFICE O/P EST MOD 30 MIN: CPT | Mod: GW | Performed by: FAMILY MEDICINE

## 2019-07-11 PROCEDURE — 72100 X-RAY EXAM L-S SPINE 2/3 VWS: CPT | Mod: TC,GW | Performed by: PHYSICIAN ASSISTANT

## 2019-07-11 RX ORDER — CIPROFLOXACIN 500 MG/1
500 TABLET, FILM COATED ORAL 2 TIMES DAILY
Qty: 10 TAB | Refills: 0 | Status: SHIPPED | OUTPATIENT
Start: 2019-07-11

## 2019-07-11 RX ORDER — MORPHINE SULFATE 20 MG/ML
SOLUTION ORAL
Refills: 0 | COMMUNITY
Start: 2019-04-16

## 2019-07-11 RX ORDER — ACETAMINOPHEN 500 MG
500-1000 TABLET ORAL EVERY 6 HOURS PRN
COMMUNITY

## 2019-07-11 ASSESSMENT — ENCOUNTER SYMPTOMS
HEADACHES: 0
CONSTITUTIONAL NEGATIVE: 1
NEUROLOGICAL NEGATIVE: 1
CHILLS: 0
HEARTBURN: 0
BRUISES/BLEEDS EASILY: 0
GASTROINTESTINAL NEGATIVE: 1
DEPRESSION: 0
COUGH: 0
FEVER: 0
BLURRED VISION: 0
MYALGIAS: 0
TINGLING: 0
PALPITATIONS: 0
NAUSEA: 0
EYES NEGATIVE: 1
PSYCHIATRIC NEGATIVE: 1
DOUBLE VISION: 0
BACK PAIN: 1
CARDIOVASCULAR NEGATIVE: 1
HEMOPTYSIS: 0
DIZZINESS: 0
RESPIRATORY NEGATIVE: 1

## 2019-07-11 NOTE — PROGRESS NOTES
Subjective:      Shun Alvarez is a 74 y.o. 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 Plasmacytoma of bone (HCC)    - DX-LUMBAR SPINE-2 OR 3 VIEWS; Future    2. Bilateral hip pain    - DX-LUMBAR SPINE-2 OR 3 VIEWS; Future    3. Asymptomatic microscopic hematuria  \  - ciprofloxacin (CIPRO) 500 MG Tab; Take 1 Tab by mouth 2 times a day.  Dispense: 10 Tab; Refill: 0  - CT-RENAL COLIC EVALUATION(A/P W/O); Future    Past Medical History:  No date: Cataract      Comment:  removed bilat  No date: Hyperlipidemia  No date: Hypertension  No date: PAD (peripheral artery disease)  08-: Pain      Comment:  left leg w/walking, 0/10  No date: Retinopathy of left eye      Comment:  due to previous surgery  No date: Snoring  Past Surgical History:  8/10/2016: FEMORAL ENDARTERECTOMY; Left      Comment:  Procedure: FEMORAL ENDARTERECTOMY COMMON;  Surgeon: Miky Whitney M.D.;  Location: SURGERY [facility];                 Service:   6/15/2016: RECOVERY      Comment:  Procedure: VASCULAR CASE-MELVIN-ABDOMINAL AORTOGRAM WITH               RUNOFF, POSSIBLE INTERVENTION 10334, 10841, [id number],                ATHEROSCLEROSIS LOWER EXTREMITIES I70.209;  Surgeon:                [facility];  Location: SURGERY PRE-POST PROC                UNIT [facility];  Service:   2000: UMBILICAL HERNIA REPAIR  No date: CATARACT EXTRACTION WITH IOL; Bilateral  Smoking status: Former Smoker                                                              Packs/day: 1.00      Years: 40.00        Types: Cigarettes      Quit date: 1/1/1989  Smokeless tobacco: Never Used                      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    Review of patient's family history indicates:  Problem: Diabetes      Relation: Father       Age of Onset: (Not Specified)   Problem: Heart Disease      Relation: Mother       Age of Onset: (Not Specified)   Problem: Heart Disease      Relation: Brother       Age of Onset: (Not Specified)       Comment: arrhythmia      Current Outpatient Prescriptions: •  acetaminophen (TYLENOL) 500 MG Tab, Take 500-1,000 mg by mouth every 6 hours as needed., Disp: , Rfl: •  ciprofloxacin (CIPRO) 500 MG Tab, Take 1 Tab by mouth 2 times a day., Disp: 10 Tab, Rfl: 0•  fenofibrate (TRIGLIDE) 160 MG tablet, TAKE ONE TABLET BY MOUTH DAILY, Disp: 100 Tab, Rfl: 0•  lisinopril-hydrochlorothiazide (PRINZIDE, ZESTORETIC) 20-25 MG per tablet, TAKE ONE TABLET BY MOUTH DAILY, Disp: 100 Tab, Rfl: 1•  amLODIPine (NORVASC) 10 MG Tab, TAKE ONE TABLET BY MOUTH DAILY, Disp: 90 Tab, Rfl: 0•  terazosin (HYTRIN) 1 MG Cap, TAKE ONE CAPSULE BY MOUTH EVERY NIGHT AT BEDTIME, Disp: 30 Cap, Rfl: 2•  pentoxifylline CR (TRENTAL) 400 MG CR tablet, TAKE ONE TABLET BY MOUTH THREE TIMES A DAY WITH FOOD, Disp: 270 Tab, Rfl: 0•  sertraline (ZOLOFT) 25 MG tablet, Take 1 Tab by mouth every day., Disp: 90 Tab, Rfl: 0•  metoprolol (LOPRESSOR) 50 MG Tab, TAKE ONE TABLET BY MOUTH TWICE A DAY, Disp: 180 Tab, Rfl: 0•  furosemide (LASIX) 40 MG Tab, Take 1 Tab by mouth every day., Disp: 30 Tab, Rfl: 3•  gabapentin (NEURONTIN) 100 MG Cap, TAKE ONE CAPSULE BY MOUTH THREE TIMES A DAY AS NEEDED FOR PAIN, Disp: 90 Cap, Rfl: 0•  cholecalciferol (CVS VIT D 5000 HIGH-POTENCY) 5000 UNIT Cap, Take 1 Cap by mouth every day., Disp: 30 Cap, Rfl: 6•  morphine 100 MG/5ML Solution, TAKE 0.25ML UNDER TONGUE EVERY 2 HOURS AS NEEDED FOR PAIN, Disp: , Rfl: 0•  QUEtiapine (SEROQUEL) 25 MG Tab, Take 25 mg by mouth every 12 hours., Disp: , Rfl: •  aspirin (ASA) 81 MG Chew  "Tab chewable tablet, Take 81 mg by mouth every day., Disp: , Rfl: •  timolol (TIMOPTIC) 0.5 % Solution, Place 1 Drop in both eyes every day., Disp: , Rfl: •  dorzolamide (TRUSOPT) 2 % Solution, Place 1 Drop in both eyes 2 Times a Day., Disp: , Rfl:     Patient was instructed on the use of medications, either prescriptions or OTC and informed on when the appropriate follow up time period should be. In addition, patient was also instructed that should any acute worsening occur that they should notify this clinic asap or call 911.            Review of Systems   Constitutional: Negative.  Negative for chills and fever.   HENT: Negative.  Negative for hearing loss.    Eyes: Negative.  Negative for blurred vision and double vision.   Respiratory: Negative.  Negative for cough and hemoptysis.    Cardiovascular: Negative.  Negative for chest pain and palpitations.   Gastrointestinal: Negative.  Negative for heartburn and nausea.   Genitourinary: Negative.  Negative for dysuria.   Musculoskeletal: Positive for back pain. Negative for myalgias.   Skin: Negative.  Negative for rash.   Neurological: Negative.  Negative for dizziness, tingling and headaches.   Endo/Heme/Allergies: Negative.  Does not bruise/bleed easily.   Psychiatric/Behavioral: Negative.  Negative for depression and suicidal ideas.   All other systems reviewed and are negative.         Objective:     /70   Pulse 60   Temp 36.3 °C (97.4 °F)   Resp 12   Ht 1.74 m (5' 8.5\")   Wt 81.6 kg (180 lb)   SpO2 97%   BMI 26.97 kg/m²      Physical Exam   Constitutional: He is oriented to person, place, and time. He appears well-developed and well-nourished. No distress.   HENT:   Head: Normocephalic and atraumatic.   Mouth/Throat: Oropharynx is clear and moist. No oropharyngeal exudate.   Eyes: Pupils are equal, round, and reactive to light.   Cardiovascular: Normal rate, regular rhythm, normal heart sounds and intact distal pulses.  Exam reveals no gallop and " no friction rub.    No murmur heard.  Pulmonary/Chest: Effort normal and breath sounds normal. No respiratory distress. He has no wheezes. He has no rales. He exhibits no tenderness.   Musculoskeletal:        Lumbar back: He exhibits decreased range of motion and pain. He exhibits no tenderness.   Old surgical scar   Neurological: He is alert and oriented to person, place, and time.   Skin: He is not diaphoretic.   Psychiatric: He has a normal mood and affect. His behavior is normal. Judgment and thought content normal.   Nursing note and vitals reviewed.              Assessment/Plan:     1. Plasmacytoma of bone (HCC)    - DX-LUMBAR SPINE-2 OR 3 VIEWS; Future    2. Bilateral hip pain    - DX-LUMBAR SPINE-2 OR 3 VIEWS; Future    3. Asymptomatic microscopic hematuria    - ciprofloxacin (CIPRO) 500 MG Tab; Take 1 Tab by mouth 2 times a day.  Dispense: 10 Tab; Refill: 0  - CT-RENAL COLIC EVALUATION(A/P W/O); Future

## 2019-07-30 RX ORDER — SERTRALINE HYDROCHLORIDE 25 MG/1
TABLET, FILM COATED ORAL
Qty: 100 TAB | Refills: 0 | Status: SHIPPED | OUTPATIENT
Start: 2019-07-30 | End: 2019-11-01 | Stop reason: SDUPTHER

## 2019-07-30 NOTE — TELEPHONE ENCOUNTER
Was the patient seen in the last year in this department? Yes    Does patient have an active prescription for medications requested? Yes    Received Request Via: Pharmacy     Last visit 7/11/2019  Last labs 7/2/2019

## 2019-08-09 DIAGNOSIS — I10 ESSENTIAL HYPERTENSION: ICD-10-CM

## 2019-08-09 DIAGNOSIS — I73.9 PAD (PERIPHERAL ARTERY DISEASE) (HCC): ICD-10-CM

## 2019-08-09 DIAGNOSIS — E78.2 MIXED HYPERLIPIDEMIA: ICD-10-CM

## 2019-08-09 NOTE — TELEPHONE ENCOUNTER
Was the patient seen in the last year in this department? Yes    Does patient have an active prescription for medications requested? Yes    Received Request Via: Pharmacy     Last Visit:07/11/2019  Last Lab:07/02/2019

## 2019-08-12 RX ORDER — METOPROLOL TARTRATE 50 MG/1
TABLET, FILM COATED ORAL
Qty: 180 TAB | Refills: 0 | Status: SHIPPED | OUTPATIENT
Start: 2019-08-12

## 2019-08-13 ENCOUNTER — OFFICE VISIT (OUTPATIENT)
Dept: MEDICAL GROUP | Facility: PHYSICIAN GROUP | Age: 74
End: 2019-08-13
Payer: COMMERCIAL

## 2019-08-13 VITALS
SYSTOLIC BLOOD PRESSURE: 144 MMHG | BODY MASS INDEX: 28.66 KG/M2 | WEIGHT: 178.3 LBS | TEMPERATURE: 97.1 F | OXYGEN SATURATION: 97 % | DIASTOLIC BLOOD PRESSURE: 58 MMHG | HEART RATE: 54 BPM | RESPIRATION RATE: 12 BRPM | HEIGHT: 66 IN

## 2019-08-13 DIAGNOSIS — I10 ESSENTIAL HYPERTENSION: ICD-10-CM

## 2019-08-13 DIAGNOSIS — R60.0 PEDAL EDEMA: ICD-10-CM

## 2019-08-13 DIAGNOSIS — C90.30 PLASMACYTOMA OF BONE (HCC): ICD-10-CM

## 2019-08-13 PROCEDURE — 99214 OFFICE O/P EST MOD 30 MIN: CPT | Performed by: FAMILY MEDICINE

## 2019-08-13 RX ORDER — FUROSEMIDE 40 MG/1
40 TABLET ORAL DAILY
Qty: 30 TAB | Refills: 3 | Status: SHIPPED | OUTPATIENT
Start: 2019-08-13

## 2019-08-13 ASSESSMENT — ENCOUNTER SYMPTOMS
EYES NEGATIVE: 1
HEARTBURN: 0
BACK PAIN: 1
PSYCHIATRIC NEGATIVE: 1
NEUROLOGICAL NEGATIVE: 1
TINGLING: 0
DEPRESSION: 0
HEMOPTYSIS: 0
MYALGIAS: 0
PALPITATIONS: 0
FEVER: 0
GASTROINTESTINAL NEGATIVE: 1
HEADACHES: 0
DIZZINESS: 0
BRUISES/BLEEDS EASILY: 0
COUGH: 0
SHORTNESS OF BREATH: 1
BLURRED VISION: 0
NAUSEA: 0
DOUBLE VISION: 0
CHILLS: 0

## 2019-08-13 NOTE — PROGRESS NOTES
Subjective:      Shun Alvarez is a 74 y.o. male who presents with Results (xray and ct) and Edema (left and right)            1. Plasmacytoma of bone (HCC)  Patient with a history of plasmacytoma treated in the past and according to the patient given a clean bill of health by oncology 6 months ago  He had been having some continued back pain and we did a ct for eval of possible kidney stones  No stones seen on scan but T8 and multiple ribs with lytic lesions c/w his history of plasmacytoma  Will get him a referral back to his oncologist for evaluation of this ct scan and possible treatment before these lesions worsen  - REFERRAL TO HEMATOLOGY ONCOLOGY Referral to? Other    2. Essential hypertension  Currently treated for HTN, taking meds with no CP or sob, monitors bp at home periodically. controlled    - furosemide (LASIX) 40 MG Tab; Take 1 Tab by mouth every day.  Dispense: 30 Tab; Refill: 3    3. Pedal edema  Was out of lasix and edema worsened again  Will refill    - furosemide (LASIX) 40 MG Tab; Take 1 Tab by mouth every day.  Dispense: 30 Tab; Refill: 3    Past Medical History:  No date: Cataract      Comment:  removed bilat  No date: Hyperlipidemia  No date: Hypertension  No date: PAD (peripheral artery disease)  08-: Pain      Comment:  left leg w/walking, 0/10  No date: Retinopathy of left eye      Comment:  due to previous surgery  No date: Snoring  Past Surgical History:  8/10/2016: FEMORAL ENDARTERECTOMY; Left      Comment:  Procedure: FEMORAL ENDARTERECTOMY COMMON;  Surgeon: Miky Whitney M.D.;  Location: SURGERY [facility];                 Service:   6/15/2016: RECOVERY      Comment:  Procedure: VASCULAR CASE-MELVIN-ABDOMINAL AORTOGRAM WITH               RUNOFF, POSSIBLE INTERVENTION 32754, 44545, 57845,                ATHEROSCLEROSIS LOWER EXTREMITIES I70.209;  Surgeon:                [facility];  [place]POST PROC                [place] [facility] [place]   Service:   2000: UMBILICAL HERNIA REPAIR  No date: CATARACT EXTRACTION WITH IOL; Bilateral  Social History    Tobacco Use      Smoking status: Former Smoker        Packs/day: 1.00        Years: 40.00        Pack years: 40        Types: Cigarettes        Quit date: 1989        Years since quittin.6      Smokeless tobacco: Never Used    Alcohol use: No      Alcohol/week: 0.0 oz      Comment: sober for years    Drug use: No    Review of patient's family history indicates:  Problem: Diabetes      Relation: Father          Age of Onset: (Not Specified)  Problem: Heart Disease      Relation: Mother          Age of Onset: (Not Specified)  Problem: Heart Disease      Relation: Brother          Age of Onset: (Not Specified)          Comment: arrhythmia      Current Outpatient Medications: •  furosemide (LASIX) 40 MG Tab, Take 1 Tab by mouth every day., Disp: 30 Tab, Rfl: 3•  metoprolol (LOPRESSOR) 50 MG Tab, TAKE ONE TABLET BY MOUTH TWICE A DAY, Disp: 180 Tab, Rfl: 0•  sertraline (ZOLOFT) 25 MG tablet, TAKE ONE TABLET BY MOUTH DAILY, Disp: 100 Tab, Rfl: 0•  acetaminophen (TYLENOL) 500 MG Tab, Take 500-1,000 mg by mouth every 6 hours as needed., Disp: , Rfl: •  fenofibrate (TRIGLIDE) 160 MG tablet, TAKE ONE TABLET BY MOUTH DAILY, Disp: 100 Tab, Rfl: 0•  lisinopril-hydrochlorothiazide (PRINZIDE, ZESTORETIC) 20-25 MG per tablet, TAKE ONE TABLET BY MOUTH DAILY, Disp: 100 Tab, Rfl: 1•  amLODIPine (NORVASC) 10 MG Tab, TAKE ONE TABLET BY MOUTH DAILY, Disp: 90 Tab, Rfl: 0•  terazosin (HYTRIN) 1 MG Cap, TAKE ONE CAPSULE BY MOUTH EVERY NIGHT AT BEDTIME, Disp: 30 Cap, Rfl: 2•  pentoxifylline CR (TRENTAL) 400 MG CR tablet, TAKE ONE TABLET BY MOUTH THREE TIMES A DAY WITH FOOD, Disp: 270 Tab, Rfl: 0•  QUEtiapine (SEROQUEL) 25 MG Tab, Take 25 mg by mouth every 12 hours., Disp: , Rfl: •  cholecalciferol (CVS VIT D 5000 HIGH-POTENCY) 5000 UNIT Cap, Take 1 Cap by mouth every day., Disp: 30 Cap, Rfl: 6•  aspirin (ASA) 81 MG Chew Tab  chewable tablet, Take 81 mg by mouth every day., Disp: , Rfl: •  timolol (TIMOPTIC) 0.5 % Solution, Place 1 Drop in both eyes every day., Disp: , Rfl: •  dorzolamide (TRUSOPT) 2 % Solution, Place 1 Drop in both eyes 2 Times a Day., Disp: , Rfl: •  morphine 100 MG/5ML Solution, TAKE 0.25ML UNDER TONGUE EVERY 2 HOURS AS NEEDED FOR PAIN, Disp: , Rfl: 0•  ciprofloxacin (CIPRO) 500 MG Tab, Take 1 Tab by mouth 2 times a day. (Patient not taking: Reported on 8/13/2019), Disp: 10 Tab, Rfl: 0•  gabapentin (NEURONTIN) 100 MG Cap, TAKE ONE CAPSULE BY MOUTH THREE TIMES A DAY AS NEEDED FOR PAIN (Patient not taking: Reported on 8/13/2019), Disp: 90 Cap, Rfl: 0    Patient was instructed on the use of medications, either prescriptions or OTC and informed on when the appropriate follow up time period should be. In addition, patient was also instructed that should any acute worsening occur that they should notify this clinic asap or call 911.          Review of Systems   Constitutional: Positive for malaise/fatigue. Negative for chills and fever.   HENT: Negative.  Negative for hearing loss.    Eyes: Negative.  Negative for blurred vision and double vision.   Respiratory: Positive for shortness of breath. Negative for cough and hemoptysis.    Cardiovascular: Positive for leg swelling. Negative for chest pain and palpitations.   Gastrointestinal: Negative.  Negative for heartburn and nausea.   Genitourinary: Negative.  Negative for dysuria.   Musculoskeletal: Positive for back pain. Negative for myalgias.   Skin: Negative.  Negative for rash.   Neurological: Negative.  Negative for dizziness, tingling and headaches.   Endo/Heme/Allergies: Negative.  Does not bruise/bleed easily.   Psychiatric/Behavioral: Negative.  Negative for depression and suicidal ideas.   All other systems reviewed and are negative.         Objective:     /58 (BP Location: Left arm, Patient Position: Sitting, BP Cuff Size: Adult)   Pulse (!) 54   Temp  "36.2 °C (97.1 °F)   Resp 12   Ht 1.664 m (5' 5.5\")   Wt 80.9 kg (178 lb 4.8 oz)   SpO2 97%   BMI 29.22 kg/m²      Physical Exam   Constitutional: He is oriented to person, place, and time. He appears well-developed and well-nourished. No distress.   HENT:   Head: Normocephalic and atraumatic.   Mouth/Throat: Oropharynx is clear and moist. No oropharyngeal exudate.   Eyes: Pupils are equal, round, and reactive to light.   Cardiovascular: Normal rate, regular rhythm, normal heart sounds and intact distal pulses. Exam reveals no gallop and no friction rub.   No murmur heard.  Pulmonary/Chest: Effort normal and breath sounds normal. No respiratory distress. He has no wheezes. He has no rales. He exhibits no tenderness.   Musculoskeletal: He exhibits edema.   Neurological: He is alert and oriented to person, place, and time.   Skin: He is not diaphoretic.   Anterior right lower leg healing blistered area present from edema, advised on wound care no current infection   Psychiatric: He has a normal mood and affect. His behavior is normal. Judgment and thought content normal.   Nursing note and vitals reviewed.              Assessment/Plan:     1. Plasmacytoma of bone (HCC)    - REFERRAL TO HEMATOLOGY ONCOLOGY Referral to? Other    2. Essential hypertension    - furosemide (LASIX) 40 MG Tab; Take 1 Tab by mouth every day.  Dispense: 30 Tab; Refill: 3    3. Pedal edema  - furosemide (LASIX) 40 MG Tab; Take 1 Tab by mouth every day.  Dispense: 30 Tab; Refill: 3    "

## 2019-09-13 DIAGNOSIS — I10 ESSENTIAL HYPERTENSION: ICD-10-CM

## 2019-09-13 DIAGNOSIS — N40.1 BENIGN PROSTATIC HYPERPLASIA WITH LOWER URINARY TRACT SYMPTOMS, SYMPTOM DETAILS UNSPECIFIED: ICD-10-CM

## 2019-09-16 RX ORDER — TERAZOSIN 1 MG/1
CAPSULE ORAL
Qty: 30 CAP | Refills: 1 | Status: SHIPPED | OUTPATIENT
Start: 2019-09-16 | End: 2019-10-14 | Stop reason: SDUPTHER

## 2019-09-27 DIAGNOSIS — E78.2 MIXED HYPERLIPIDEMIA: Primary | ICD-10-CM

## 2019-09-27 DIAGNOSIS — M25.552 BILATERAL HIP PAIN: ICD-10-CM

## 2019-09-27 DIAGNOSIS — I10 ESSENTIAL HYPERTENSION: ICD-10-CM

## 2019-09-27 DIAGNOSIS — M25.551 BILATERAL HIP PAIN: ICD-10-CM

## 2019-09-27 NOTE — TELEPHONE ENCOUNTER
Was the patient seen in the last year in this department? Yes    Does patient have an active prescription for medications requested? Yes    Received Request Via: Pharmacy     Last visit    8-13-19    Last lab     7-5-19

## 2019-09-30 RX ORDER — FENOFIBRATE 160 MG/1
TABLET ORAL
Qty: 90 TAB | Refills: 0 | Status: SHIPPED | OUTPATIENT
Start: 2019-09-30 | End: 2020-01-02

## 2019-09-30 RX ORDER — AMLODIPINE BESYLATE 10 MG/1
TABLET ORAL
Qty: 90 TAB | Refills: 0 | Status: SHIPPED | OUTPATIENT
Start: 2019-09-30 | End: 2020-01-08

## 2019-10-11 NOTE — TELEPHONE ENCOUNTER
Was the patient seen in the last year in this department? Yes    Does patient have an active prescription for medications requested? Yes    Received Request Via: Pharmacy     Last visit  8-13-19    Last lab 7-2-19

## 2019-10-14 DIAGNOSIS — N40.1 BENIGN PROSTATIC HYPERPLASIA WITH LOWER URINARY TRACT SYMPTOMS, SYMPTOM DETAILS UNSPECIFIED: ICD-10-CM

## 2019-10-14 RX ORDER — TERAZOSIN 1 MG/1
CAPSULE ORAL
Qty: 90 CAP | Refills: 1 | Status: SHIPPED | OUTPATIENT
Start: 2019-10-14

## 2019-10-14 RX ORDER — PENTOXIFYLLINE 400 MG/1
400 TABLET, EXTENDED RELEASE ORAL
Qty: 270 TAB | Refills: 0 | Status: SHIPPED | OUTPATIENT
Start: 2019-10-14

## 2019-10-14 NOTE — TELEPHONE ENCOUNTER
Was the patient seen in the last year in this department? Yes     Does patient have an active prescription for medications requested? Yes     Received Request Via: Patient     Last visit  8-13-19     Last lab 7-2-19

## 2019-10-14 NOTE — TELEPHONE ENCOUNTER
----- Message from Windy Baltazar sent at 10/11/2019 10:07 AM PDT -----  Regarding: FW: refill request change  Contact: [phone]      ----- Message -----  From: Shun Alvarez  Sent: 10/11/2019   8:32 AM PDT  To: Roddy All Mas  Subject: refill request change                            Topic: Bill/Statement    Can you send a refill to my pharmacy for TERAZOSIN but change it to 90 day instead of 30?  Thank you.

## 2019-11-07 RX ORDER — SERTRALINE HYDROCHLORIDE 25 MG/1
TABLET, FILM COATED ORAL
Qty: 90 TAB | Refills: 0 | Status: SHIPPED | OUTPATIENT
Start: 2019-11-07

## 2019-11-12 ENCOUNTER — OFFICE VISIT (OUTPATIENT)
Dept: MEDICAL GROUP | Facility: PHYSICIAN GROUP | Age: 74
End: 2019-11-12
Payer: MEDICARE

## 2019-11-12 VITALS
HEIGHT: 66 IN | BODY MASS INDEX: 29.89 KG/M2 | RESPIRATION RATE: 19 BRPM | WEIGHT: 186 LBS | TEMPERATURE: 97.5 F | SYSTOLIC BLOOD PRESSURE: 130 MMHG | DIASTOLIC BLOOD PRESSURE: 70 MMHG | HEART RATE: 75 BPM | OXYGEN SATURATION: 95 %

## 2019-11-12 DIAGNOSIS — I87.8 VENOUS STASIS: ICD-10-CM

## 2019-11-12 DIAGNOSIS — R60.0 PEDAL EDEMA: ICD-10-CM

## 2019-11-12 DIAGNOSIS — L98.421 SACRAL ULCER, LIMITED TO BREAKDOWN OF SKIN (HCC): ICD-10-CM

## 2019-11-12 DIAGNOSIS — C90.30 PLASMACYTOMA OF BONE (HCC): ICD-10-CM

## 2019-11-12 PROCEDURE — 99214 OFFICE O/P EST MOD 30 MIN: CPT | Performed by: FAMILY MEDICINE

## 2019-11-12 RX ORDER — SPIRONOLACTONE 25 MG/1
25 TABLET ORAL DAILY
Qty: 30 TAB | Refills: 3 | Status: SHIPPED | OUTPATIENT
Start: 2019-11-12

## 2019-11-12 RX ORDER — LENALIDOMIDE 15 MG/1
CAPSULE ORAL
COMMUNITY
Start: 2019-10-18

## 2019-11-12 ASSESSMENT — ENCOUNTER SYMPTOMS
EYES NEGATIVE: 1
RESPIRATORY NEGATIVE: 1
PSYCHIATRIC NEGATIVE: 1
NEUROLOGICAL NEGATIVE: 1
DIZZINESS: 0
CHILLS: 0
GASTROINTESTINAL NEGATIVE: 1
MYALGIAS: 0
CONSTITUTIONAL NEGATIVE: 1
NAUSEA: 0
TINGLING: 0
MUSCULOSKELETAL NEGATIVE: 1
HEADACHES: 0
DEPRESSION: 0
COUGH: 0
FEVER: 0
HEARTBURN: 0
BLURRED VISION: 0
DOUBLE VISION: 0
BRUISES/BLEEDS EASILY: 0
HEMOPTYSIS: 0
PALPITATIONS: 0

## 2019-11-12 NOTE — PROGRESS NOTES
Subjective:      Shun Alvarez is a 74 y.o. male who presents with Edema (Ankles and blisters, oncologist suggest BP medication to be adjusted) and Wound Check (MIddle of buttocks, been present for x2 months)            1. Venous stasis  Worsening of venous stasis with edema in both legs and some superficial ulceration in the anterior shin on both sides  On exam 1+ edema bilaterally with superficial ulcers, will add aldactone to regimen and check echo and send to wound clinic for wraps for both this and care for sacral ulcer  Will check echo for any cardiac component  - spironolactone (ALDACTONE) 25 MG Tab; Take 1 Tab by mouth every day.  Dispense: 30 Tab; Refill: 3  - EC-ECHOCARDIOGRAM COMPLETE W/O CONT; Future  - REFERRAL TO WOUND CLINIC    2. Sacral ulcer, limited to breakdown of skin (HCC)  2 by 2 cm superficial ulcer on left upper buttock  - REFERRAL TO WOUND CLINIC    3. Pedal edema    - spironolactone (ALDACTONE) 25 MG Tab; Take 1 Tab by mouth every day.  Dispense: 30 Tab; Refill: 3  - EC-ECHOCARDIOGRAM COMPLETE W/O CONT; Future    4. Plasmacytoma of bone (HCC)  Per oncology    Past Medical History:  No date: Cataract      Comment:  removed bilat  No date: Hyperlipidemia  No date: Hypertension  No date: PAD (peripheral artery disease)  08-: Pain      Comment:  left leg w/walking, 0/10  No date: Retinopathy of left eye      Comment:  due to previous surgery  No date: Snoring  Past Surgical History:  8/10/2016: FEMORAL ENDARTERECTOMY; Left      Comment:  Procedure: FEMORAL ENDARTERECTOMY COMMON;  Surgeon: Miky Whitney M.D.;  Location: SURGERY [facility];                 Service:   6/15/2016: RECOVERY      Comment:  Procedure: VASCULAR CASE-MELVIN-ABDOMINAL AORTOGRAM WITH               RUNOFF, POSSIBLE INTERVENTION 60807, 55626, 84950,                ATHEROSCLEROSIS LOWER EXTREMITIES I70.209;  Surgeon:                V[facility]al Surgery;  Location: SURGERY PRE-POST PROC           [place] [facility] [place];  Service:   2000: UMBILICAL HERNIA REPAIR  No date: CATARACT EXTRACTION WITH IOL; Bilateral  Social History    Tobacco Use      Smoking status: Former Smoker        Packs/day: 1.00        Years: 40.00        Pack years: 40        Types: Cigarettes        Quit date: 1989        Years since quittin.8      Smokeless tobacco: Never Used    Alcohol use: No      Alcohol/week: 0.0 oz      Comment: sober for years    Drug use: No    Review of patient's family history indicates:  Problem: Diabetes      Relation: Father          Age of Onset: (Not Specified)  Problem: Heart Disease      Relation: Mother          Age of Onset: (Not Specified)  Problem: Heart Disease      Relation: Brother          Age of Onset: (Not Specified)          Comment: arrhythmia      Current Outpatient Medications: •  REVLIMID 15 MG Cap, , Disp: , Rfl: •  spironolactone (ALDACTONE) 25 MG Tab, Take 1 Tab by mouth every day., Disp: 30 Tab, Rfl: 3•  sertraline (ZOLOFT) 25 MG tablet, TAKE ONE TABLET BY MOUTH DAILY, Disp: 90 Tab, Rfl: 0•  pentoxifylline CR (TRENTAL) 400 MG CR tablet, Take 1 Tab by mouth 3 times a day, with meals., Disp: 270 Tab, Rfl: 0•  terazosin (HYTRIN) 1 MG Cap, TAKE ONE CAPSULE BY MOUTH EVERY NIGHT AT BEDTIME, Disp: 90 Cap, Rfl: 1•  fenofibrate (TRIGLIDE) 160 MG tablet, TAKE ONE TABLET BY MOUTH DAILY, Disp: 90 Tab, Rfl: 0•  amLODIPine (NORVASC) 10 MG Tab, TAKE ONE TABLET BY MOUTH DAILY, Disp: 90 Tab, Rfl: 0•  furosemide (LASIX) 40 MG Tab, Take 1 Tab by mouth every day., Disp: 30 Tab, Rfl: 3•  metoprolol (LOPRESSOR) 50 MG Tab, TAKE ONE TABLET BY MOUTH TWICE A DAY, Disp: 180 Tab, Rfl: 0•  morphine 100 MG/5ML Solution, TAKE 0.25ML UNDER TONGUE EVERY 2 HOURS AS NEEDED FOR PAIN, Disp: , Rfl: 0•  acetaminophen (TYLENOL) 500 MG Tab, Take 500-1,000 mg by mouth every 6 hours as needed., Disp: , Rfl: •  lisinopril-hydrochlorothiazide (PRINZIDE, ZESTORETIC) 20-25 MG per tablet, TAKE ONE TABLET BY MOUTH DAILY, Disp: 100  Tab, Rfl: 1•  QUEtiapine (SEROQUEL) 25 MG Tab, Take 25 mg by mouth every 12 hours., Disp: , Rfl: •  gabapentin (NEURONTIN) 100 MG Cap, TAKE ONE CAPSULE BY MOUTH THREE TIMES A DAY AS NEEDED FOR PAIN, Disp: 90 Cap, Rfl: 0•  cholecalciferol (CVS VIT D 5000 HIGH-POTENCY) 5000 UNIT Cap, Take 1 Cap by mouth every day., Disp: 30 Cap, Rfl: 6•  aspirin (ASA) 81 MG Chew Tab chewable tablet, Take 81 mg by mouth every day., Disp: , Rfl: •  timolol (TIMOPTIC) 0.5 % Solution, Place 1 Drop in both eyes every day., Disp: , Rfl: •  dorzolamide (TRUSOPT) 2 % Solution, Place 1 Drop in both eyes 2 Times a Day., Disp: , Rfl: •  ciprofloxacin (CIPRO) 500 MG Tab, Take 1 Tab by mouth 2 times a day. (Patient not taking: Reported on 8/13/2019), Disp: 10 Tab, Rfl: 0    Patient was instructed on the use of medications, either prescriptions or OTC and informed on when the appropriate follow up time period should be. In addition, patient was also instructed that should any acute worsening occur that they should notify this clinic asap or call 911.          Review of Systems   Constitutional: Negative.  Negative for chills and fever.   HENT: Negative.  Negative for hearing loss.    Eyes: Negative.  Negative for blurred vision and double vision.   Respiratory: Negative.  Negative for cough and hemoptysis.    Cardiovascular: Positive for leg swelling. Negative for chest pain and palpitations.   Gastrointestinal: Negative.  Negative for heartburn and nausea.   Genitourinary: Negative.  Negative for dysuria.   Musculoskeletal: Negative.  Negative for myalgias.   Skin: Negative.  Negative for rash.   Neurological: Negative.  Negative for dizziness, tingling and headaches.   Endo/Heme/Allergies: Negative.  Does not bruise/bleed easily.   Psychiatric/Behavioral: Negative.  Negative for depression and suicidal ideas.   All other systems reviewed and are negative.         Objective:     /70   Pulse 75   Temp 36.4 °C (97.5 °F) (Temporal)   Resp 19  "  Ht 1.664 m (5' 5.5\")   Wt 84.4 kg (186 lb)   SpO2 95%   BMI 30.48 kg/m²      Physical Exam  Vitals signs and nursing note reviewed.   Constitutional:       General: He is not in acute distress.     Appearance: He is well-developed. He is not diaphoretic.   HENT:      Head: Normocephalic and atraumatic.      Mouth/Throat:      Pharynx: No oropharyngeal exudate.   Eyes:      Pupils: Pupils are equal, round, and reactive to light.   Cardiovascular:      Rate and Rhythm: Normal rate and regular rhythm.      Heart sounds: Normal heart sounds. No murmur. No friction rub. No gallop.    Pulmonary:      Effort: Pulmonary effort is normal. No respiratory distress.      Breath sounds: Normal breath sounds. No wheezing or rales.   Chest:      Chest wall: No tenderness.   Musculoskeletal:         General: Swelling present.   Skin:         Neurological:      Mental Status: He is alert and oriented to person, place, and time.   Psychiatric:         Behavior: Behavior normal.         Thought Content: Thought content normal.         Judgment: Judgment normal.                 Assessment/Plan:       1. Venous stasis    - spironolactone (ALDACTONE) 25 MG Tab; Take 1 Tab by mouth every day.  Dispense: 30 Tab; Refill: 3  - EC-ECHOCARDIOGRAM COMPLETE W/O CONT; Future  - REFERRAL TO WOUND CLINIC    2. Sacral ulcer, limited to breakdown of skin (HCC)    - REFERRAL TO WOUND CLINIC    3. Pedal edema    - spironolactone (ALDACTONE) 25 MG Tab; Take 1 Tab by mouth every day.  Dispense: 30 Tab; Refill: 3  - EC-ECHOCARDIOGRAM COMPLETE W/O CONT; Future    4. Plasmacytoma of bone (HCC)    "

## 2019-12-11 ENCOUNTER — OFFICE VISIT (OUTPATIENT)
Dept: MEDICAL GROUP | Facility: PHYSICIAN GROUP | Age: 74
End: 2019-12-11
Payer: MEDICARE

## 2019-12-11 VITALS
HEIGHT: 66 IN | HEART RATE: 48 BPM | BODY MASS INDEX: 28.77 KG/M2 | SYSTOLIC BLOOD PRESSURE: 124 MMHG | OXYGEN SATURATION: 97 % | TEMPERATURE: 97.7 F | WEIGHT: 179 LBS | DIASTOLIC BLOOD PRESSURE: 50 MMHG

## 2019-12-11 DIAGNOSIS — C90.30 PLASMACYTOMA OF BONE (HCC): ICD-10-CM

## 2019-12-11 DIAGNOSIS — I87.8 VENOUS STASIS: ICD-10-CM

## 2019-12-11 DIAGNOSIS — L98.421 SACRAL ULCER, LIMITED TO BREAKDOWN OF SKIN (HCC): ICD-10-CM

## 2019-12-11 PROCEDURE — 99214 OFFICE O/P EST MOD 30 MIN: CPT | Performed by: FAMILY MEDICINE

## 2019-12-11 RX ORDER — DEXAMETHASONE 4 MG/1
TABLET ORAL
COMMUNITY
Start: 2019-12-09

## 2019-12-11 ASSESSMENT — ENCOUNTER SYMPTOMS
CHILLS: 0
MYALGIAS: 0
DEPRESSION: 0
FEVER: 0
HEADACHES: 0
RESPIRATORY NEGATIVE: 1
PSYCHIATRIC NEGATIVE: 1
BACK PAIN: 1
HEARTBURN: 0
COUGH: 0
DOUBLE VISION: 0
HEMOPTYSIS: 0
BRUISES/BLEEDS EASILY: 0
NAUSEA: 0
DIZZINESS: 0
TINGLING: 0
NEUROLOGICAL NEGATIVE: 1
BLURRED VISION: 0
EYES NEGATIVE: 1
GASTROINTESTINAL NEGATIVE: 1
PALPITATIONS: 0

## 2019-12-11 NOTE — PROGRESS NOTES
Subjective:      Shun Alvarez is a 74 y.o. male who presents with Follow-Up (Pt sts he is here for FV on edema and echo results.)            1. Venous stasis  Much improved with compression stocking from wound care center, echo with no involvement of heart and ef of 60% so will continue with treatment    2. Sacral ulcer, limited to breakdown of skin (HCC)  Almost resolved will continue with treatment    3. Plasmacytoma of bone (HCC)  Per dr cui on iv chemo and has f/u next week    Past Medical History:  No date: Cataract      Comment:  removed bilat  No date: Hyperlipidemia  No date: Hypertension  No date: PAD (peripheral artery disease)  2016: Pain      Comment:  left leg w/walking, 0/10  No date: Retinopathy of left eye      Comment:  due to previous surgery  No date: Snoring  Past Surgical History:  8/10/2016: FEMORAL ENDARTERECTOMY; Left      Comment:  Procedure: FEMORAL ENDARTERECTOMY COMMON;  Surgeon: Miky Whitney M.D.;  Location: SURGERY [facility];                 Service:   6/15/2016: RECOVERY      Comment:  Procedure: VASCULAR CASE-MELVIN-ABDOMINAL AORTOGRAM WITH               RUNOFF, POSSIBLE INTERVENTION 60865, 08799, [id number],                ATHEROSCLEROSIS LOWER EXTREMITIES I70.209;  Surgeon:                [facility];  Location: SURGERY [facility]-POST [facility]                [facility] [place];  Service:   : UMBILICAL HERNIA REPAIR  No date: CATARACT EXTRACTION WITH IOL; Bilateral  Social History    Tobacco Use      Smoking status: Former Smoker        Packs/day: 1.00        Years: 40.00        Pack years: 40        Types: Cigarettes        Quit date: 1989        Years since quittin.9      Smokeless tobacco: Never Used    Alcohol use: No      Alcohol/week: 0.0 oz      Comment: sober for years    Drug use: No    Review of patient's family history indicates:  Problem: Diabetes      Relation: Father          Age of Onset: (Not Specified)  Problem: Heart Disease       Relation: Mother          Age of Onset: (Not Specified)  Problem: Heart Disease      Relation: Brother          Age of Onset: (Not Specified)          Comment: arrhythmia      Current Outpatient Medications: •  dexamethasone (DECADRON) 4 MG Tab, , Disp: , Rfl: •  REVLIMID 15 MG Cap, , Disp: , Rfl: •  spironolactone (ALDACTONE) 25 MG Tab, Take 1 Tab by mouth every day., Disp: 30 Tab, Rfl: 3•  sertraline (ZOLOFT) 25 MG tablet, TAKE ONE TABLET BY MOUTH DAILY, Disp: 90 Tab, Rfl: 0•  pentoxifylline CR (TRENTAL) 400 MG CR tablet, Take 1 Tab by mouth 3 times a day, with meals., Disp: 270 Tab, Rfl: 0•  terazosin (HYTRIN) 1 MG Cap, TAKE ONE CAPSULE BY MOUTH EVERY NIGHT AT BEDTIME, Disp: 90 Cap, Rfl: 1•  fenofibrate (TRIGLIDE) 160 MG tablet, TAKE ONE TABLET BY MOUTH DAILY, Disp: 90 Tab, Rfl: 0•  amLODIPine (NORVASC) 10 MG Tab, TAKE ONE TABLET BY MOUTH DAILY, Disp: 90 Tab, Rfl: 0•  metoprolol (LOPRESSOR) 50 MG Tab, TAKE ONE TABLET BY MOUTH TWICE A DAY, Disp: 180 Tab, Rfl: 0•  acetaminophen (TYLENOL) 500 MG Tab, Take 500-1,000 mg by mouth every 6 hours as needed., Disp: , Rfl: •  lisinopril-hydrochlorothiazide (PRINZIDE, ZESTORETIC) 20-25 MG per tablet, TAKE ONE TABLET BY MOUTH DAILY, Disp: 100 Tab, Rfl: 1•  QUEtiapine (SEROQUEL) 25 MG Tab, Take 25 mg by mouth every 12 hours., Disp: , Rfl: •  gabapentin (NEURONTIN) 100 MG Cap, TAKE ONE CAPSULE BY MOUTH THREE TIMES A DAY AS NEEDED FOR PAIN, Disp: 90 Cap, Rfl: 0•  cholecalciferol ([employer] VIT D 5000 HIGH-POTENCY) 5000 UNIT Cap, Take 1 Cap by mouth every day., Disp: 30 Cap, Rfl: 6•  aspirin (ASA) 81 MG Chew Tab chewable tablet, Take 81 mg by mouth every day., Disp: , Rfl: •  timolol (TIMOPTIC) 0.5 % Solution, Place 1 Drop in both eyes every day., Disp: , Rfl: •  dorzolamide (TRUSOPT) 2 % Solution, Place 1 Drop in both eyes 2 Times a Day., Disp: , Rfl: •  furosemide (LASIX) 40 MG Tab, Take 1 Tab by mouth every day. (Patient not taking: Reported on 12/11/2019), Disp: 30 Tab, Rfl: 3•   "morphine 100 MG/5ML Solution, TAKE 0.25ML UNDER TONGUE EVERY 2 HOURS AS NEEDED FOR PAIN, Disp: , Rfl: 0•  ciprofloxacin (CIPRO) 500 MG Tab, Take 1 Tab by mouth 2 times a day. (Patient not taking: Reported on 8/13/2019), Disp: 10 Tab, Rfl: 0    Patient was instructed on the use of medications, either prescriptions or OTC and informed on when the appropriate follow up time period should be. In addition, patient was also instructed that should any acute worsening occur that they should notify this clinic asap or call 911.          Review of Systems   Constitutional: Positive for malaise/fatigue. Negative for chills and fever.   HENT: Negative.  Negative for hearing loss.    Eyes: Negative.  Negative for blurred vision and double vision.   Respiratory: Negative.  Negative for cough and hemoptysis.    Cardiovascular: Positive for leg swelling. Negative for chest pain and palpitations.   Gastrointestinal: Negative.  Negative for heartburn and nausea.   Genitourinary: Negative.  Negative for dysuria.   Musculoskeletal: Positive for back pain. Negative for myalgias.   Skin: Negative.  Negative for rash.   Neurological: Negative.  Negative for dizziness, tingling and headaches.   Endo/Heme/Allergies: Negative.  Does not bruise/bleed easily.   Psychiatric/Behavioral: Negative.  Negative for depression and suicidal ideas.   All other systems reviewed and are negative.         Objective:     /50 (BP Location: Right arm, Patient Position: Sitting, BP Cuff Size: Adult)   Pulse (!) 48   Temp 36.5 °C (97.7 °F) (Temporal)   Ht 1.664 m (5' 5.5\")   Wt 81.2 kg (179 lb)   SpO2 97%   BMI 29.33 kg/m²      Physical Exam  Vitals signs and nursing note reviewed.   Constitutional:       General: He is not in acute distress.     Appearance: He is well-developed. He is not diaphoretic.   HENT:      Head: Normocephalic and atraumatic.      Mouth/Throat:      Pharynx: No oropharyngeal exudate.   Eyes:      Pupils: Pupils are equal, " round, and reactive to light.   Cardiovascular:      Rate and Rhythm: Normal rate and regular rhythm.      Heart sounds: Normal heart sounds. No murmur. No friction rub. No gallop.    Pulmonary:      Effort: Pulmonary effort is normal. No respiratory distress.      Breath sounds: Normal breath sounds. No wheezing or rales.   Chest:      Chest wall: No tenderness.   Musculoskeletal:      Right lower leg: Edema present.      Left lower leg: Edema present.   Skin:     Findings: Erythema present.          Neurological:      Mental Status: He is alert and oriented to person, place, and time.   Psychiatric:         Behavior: Behavior normal.         Thought Content: Thought content normal.         Judgment: Judgment normal.                 Assessment/Plan:       1. Venous stasis      2. Sacral ulcer, limited to breakdown of skin (HCC)      3. Plasmacytoma of bone (HCC)

## 2019-12-31 DIAGNOSIS — E78.5 HYPERLIPIDEMIA, UNSPECIFIED HYPERLIPIDEMIA TYPE: ICD-10-CM

## 2019-12-31 DIAGNOSIS — I10 ESSENTIAL HYPERTENSION: ICD-10-CM

## 2019-12-31 DIAGNOSIS — E78.2 MIXED HYPERLIPIDEMIA: ICD-10-CM

## 2020-01-02 RX ORDER — FENOFIBRATE 160 MG/1
TABLET ORAL
Qty: 90 TAB | Refills: 0 | Status: SHIPPED | OUTPATIENT
Start: 2020-01-02

## 2020-01-02 RX ORDER — LISINOPRIL AND HYDROCHLOROTHIAZIDE 25; 20 MG/1; MG/1
TABLET ORAL
Qty: 100 TAB | Refills: 0 | Status: SHIPPED | OUTPATIENT
Start: 2020-01-02

## 2020-01-05 DIAGNOSIS — I10 ESSENTIAL HYPERTENSION: ICD-10-CM

## 2020-01-08 RX ORDER — AMLODIPINE BESYLATE 10 MG/1
TABLET ORAL
Qty: 90 TAB | Refills: 0 | Status: SHIPPED | OUTPATIENT
Start: 2020-01-08

## 2021-01-11 DIAGNOSIS — Z23 NEED FOR VACCINATION: ICD-10-CM
